# Patient Record
Sex: FEMALE | Race: WHITE | NOT HISPANIC OR LATINO | Employment: FULL TIME | URBAN - NONMETROPOLITAN AREA
[De-identification: names, ages, dates, MRNs, and addresses within clinical notes are randomized per-mention and may not be internally consistent; named-entity substitution may affect disease eponyms.]

---

## 2017-06-22 ENCOUNTER — HISTORY (OUTPATIENT)
Dept: FAMILY MEDICINE | Facility: OTHER | Age: 47
End: 2017-06-22

## 2017-06-22 ENCOUNTER — OFFICE VISIT - GICH (OUTPATIENT)
Dept: FAMILY MEDICINE | Facility: OTHER | Age: 47
End: 2017-06-22

## 2017-06-22 DIAGNOSIS — N62 HYPERTROPHY OF BREAST: ICD-10-CM

## 2017-06-22 DIAGNOSIS — B35.4 TINEA CORPORIS: ICD-10-CM

## 2017-06-22 DIAGNOSIS — Z00.00 ENCOUNTER FOR GENERAL ADULT MEDICAL EXAMINATION WITHOUT ABNORMAL FINDINGS: ICD-10-CM

## 2017-06-22 LAB
A/G RATIO - HISTORICAL: 1.5 (ref 1–2)
ABSOLUTE BASOPHILS - HISTORICAL: 0 THOU/CU MM
ABSOLUTE EOSINOPHILS - HISTORICAL: 0.2 THOU/CU MM
ABSOLUTE IMMATURE GRANULOCYTES(METAS,MYELOS,PROS) - HISTORICAL: 0 THOU/CU MM
ABSOLUTE LYMPHOCYTES - HISTORICAL: 2.7 THOU/CU MM (ref 0.9–2.9)
ABSOLUTE MONOCYTES - HISTORICAL: 0.6 THOU/CU MM
ABSOLUTE NEUTROPHILS - HISTORICAL: 5.3 THOU/CU MM (ref 1.7–7)
ALBUMIN SERPL-MCNC: 4.1 G/DL (ref 3.5–5.7)
ALP SERPL-CCNC: 58 IU/L (ref 34–104)
ALT (SGPT) - HISTORICAL: 28 IU/L (ref 7–52)
ANION GAP - HISTORICAL: 16 (ref 5–18)
AST SERPL-CCNC: 22 IU/L (ref 13–39)
BASOPHILS # BLD AUTO: 0.5 %
BILIRUB SERPL-MCNC: 0.4 MG/DL (ref 0.3–1)
BUN SERPL-MCNC: 12 MG/DL (ref 7–25)
BUN/CREAT RATIO - HISTORICAL: 18
CALCIUM SERPL-MCNC: 9.2 MG/DL (ref 8.6–10.3)
CHLORIDE SERPLBLD-SCNC: 100 MMOL/L (ref 98–107)
CHOL/HDL RATIO - HISTORICAL: 3.15
CHOLESTEROL TOTAL: 148 MG/DL
CO2 SERPL-SCNC: 22 MMOL/L (ref 21–31)
CREAT SERPL-MCNC: 0.66 MG/DL (ref 0.7–1.3)
EOSINOPHIL NFR BLD AUTO: 1.7 %
ERYTHROCYTE [DISTWIDTH] IN BLOOD BY AUTOMATED COUNT: 13.2 % (ref 11.5–15.5)
GFR IF NOT AFRICAN AMERICAN - HISTORICAL: >60 ML/MIN/1.73M2
GLOBULIN - HISTORICAL: 2.8 G/DL (ref 2–3.7)
GLUCOSE SERPL-MCNC: 99 MG/DL (ref 70–105)
HCT VFR BLD AUTO: 40 % (ref 33–51)
HDLC SERPL-MCNC: 47 MG/DL (ref 23–92)
HEMOGLOBIN: 13.6 G/DL (ref 12–16)
IMMATURE GRANULOCYTES(METAS,MYELOS,PROS) - HISTORICAL: 0.3 %
LDLC SERPL CALC-MCNC: 80 MG/DL
LYMPHOCYTES NFR BLD AUTO: 30.3 % (ref 20–44)
MCH RBC QN AUTO: 29.3 PG (ref 26–34)
MCHC RBC AUTO-ENTMCNC: 34 G/DL (ref 32–36)
MCV RBC AUTO: 86 FL (ref 80–100)
MONOCYTES NFR BLD AUTO: 6.9 %
NEUTROPHILS NFR BLD AUTO: 60.3 % (ref 42–72)
NON-HDL CHOLESTEROL - HISTORICAL: 101 MG/DL
PATIENT STATUS - HISTORICAL: NORMAL
PLATELET # BLD AUTO: 266 THOU/CU MM (ref 140–440)
PMV BLD: 9 FL (ref 6.5–11)
POTASSIUM SERPL-SCNC: 3.7 MMOL/L (ref 3.5–5.1)
PROT SERPL-MCNC: 6.9 G/DL (ref 6.4–8.9)
RED BLOOD COUNT - HISTORICAL: 4.64 MIL/CU MM (ref 4–5.2)
SODIUM SERPL-SCNC: 138 MMOL/L (ref 133–143)
TRIGL SERPL-MCNC: 103 MG/DL
TSH - HISTORICAL: 1.2 UIU/ML (ref 0.34–5.6)
WHITE BLOOD COUNT - HISTORICAL: 8.9 THOU/CU MM (ref 4.5–11)

## 2017-08-09 ENCOUNTER — HISTORY (OUTPATIENT)
Dept: RADIOLOGY | Facility: OTHER | Age: 47
End: 2017-08-09

## 2017-08-09 ENCOUNTER — HOSPITAL ENCOUNTER (OUTPATIENT)
Dept: RADIOLOGY | Facility: OTHER | Age: 47
End: 2017-08-09
Attending: FAMILY MEDICINE

## 2017-08-09 DIAGNOSIS — Z12.31 ENCOUNTER FOR SCREENING MAMMOGRAM FOR MALIGNANT NEOPLASM OF BREAST: ICD-10-CM

## 2017-12-27 NOTE — PROGRESS NOTES
Patient Information     Patient Name MRN Sex Hyacinth Dominguez [id number] Female 1970      Progress Notes by Cate Jaffe at 2017  1:34 PM     Author:  Cate Jaffe Service:  (none) Author Type:  (none)     Filed:  2017  1:34 PM Date of Service:  2017  1:34 PM Status:  Signed     :  Cate Jaffe            Falls Risk Criteria:    Age 65 and older or under age 4        Sensory deficits    Poor vision    Use of ambulatory aides    Impaired judgment    Unable to walk independently    Meets High Risk criteria for falls:  no

## 2017-12-28 NOTE — PATIENT INSTRUCTIONS
Patient Information     Patient Name MRN Hyacinth Mcdonald [id number] Female 1970      Patient Instructions by Christiane Rosales MD at 2017  8:34 AM     Author:  Christiane Rosales MD  Service:  (none) Author Type:  Physician     Filed:  2017  8:34 AM  Encounter Date:  2017 Status:  Addendum     :  Christiane Rosales MD (Physician)        Related Notes: Original Note by Christiane Rosales MD (Physician) filed at 2017  8:34 AM            [facility]: Eating Right and Getting Fit -- Even When You Don t Have Time     Eating Right and Getting Fit  Eating well-balanced meals and getting regular physical activity can help you reduce your risk of certain diseases and increase your energy level. But if you are too busy, trying to eat right and be active may seem like impossible goals. They re not!  Making small changes that fit your lifestyle can help you slowly improve your diet and fitness. Finding your balance between smart food choices and physical activity can help you feel better -- even when you don t have time.  Most of the information in this booklet is from the [place] Department of Agriculture. For complete information, visit choosemyplate.gov.  For information about your health, talk with your health care provider.  Nutrition  A healthful diet is one that:            focuses on fruit, vegetables, whole grains, and fat-free or low-fat milk            includes lean meats, poultry, fish, beans, eggs and nuts            is low in saturated fats, trans fats, cholesterol, salt and added sugars.  To help make good food choices, you should eat a variety of foods from all of the following food groups.  Grain Group  Grain products are made from wheat, rice, oats, cornmeal, barley or another cereal grain. Examples of foods in this group are bread, pasta, oatmeal, tortillas and grits. Grains are split into two groups:            whole grains  These contain the entire grain  kernel (bran, germ and endosperm). The whole grains are rich in fiber, B vitamins and iron. Examples are whole-wheat flour, bulgur, oatmeal and brown rice. Make at least half of your grains whole grains.            refined grains  These have gone through a process to remove the bran and germ. This gives the grains a fine texture but removes the fiber, iron and several B vitamins. Examples are white flour, white bread and white rice.    Most refined grains are enriched. This means some B vitamins and iron are added back in after processing.    Benefits of eating whole grains            Eating foods rich in fiber may:  -   reduce the risk of heart disease, obesity and type 2 diabetes  -   help lower cholesterol levels  -   reduce constipation  -   help you manage your weight (helps keep you feeling  full  longer)  -   help prevent neural tube defects during pregnancy.            Whole grains contain fiber, many B vitamins (such as thiamin, riboflavin, niacin and folate) and minerals (such as iron and magnesium).           The vitamins and minerals in whole grains help build red blood cells, build bones, and release energy.  Tips for eating whole grains            Try whole-wheat bread, whole-wheat pasta or brown rice instead of white bread, white pasta or white rice.            Use whole grains in mixed dishes. For instance, use barley in vegetable soups or stews and bulgur wheat in casseroles.            Use whole-grain bread or cracker crumbs in meatloaf.            Add whole-grain flour or oatmeal when making cookies.            Try a 100 percent whole-grain snack.    Amounts needed each day  The following recommendations are for adults who get less than 30 minutes of moderate physical activity a day.            Women:  -   19 to 50 years: 6 ounces  -   51 + years: 5 ounces            Men:  -   19 to 30 years: 8 ounces  -   31 to 50 years: 7 ounces  -   51 + years: 6 ounces    Serving sizes  One ounce of grains is  equal to:            1 slice of bread            1 cup of ready-to-eat cereal              cup cooked rice, pasta or cereal            five whole wheat crackers              English muffin            1 pancake 4   inches in diameter            3 cups popped popcorn            1 flour tortilla 6 inches in diameter.  Vegetable Group  Any vegetable or 100 percent vegetable juice is included in this group. Vegetables may be raw, cooked, fresh, frozen, canned or dried. They are split up into five groups:            dark green  bok kell, broccoli, trinity greens, dark green leafy lettuce, kale, iman lettuce, spinach, turnip greens            red and orange  acorn squash, butternut squash, carrots, pumpkin, red peppers, sweet potatoes, tomatoes            beans and peas  black beans, black-eyed peas, garbanzo beans, kidney beans, lentils, navy beans, raymundo beans, soy beans, split peas, white beans            starchy  cassava, corn, green peas, plantains, potatoes, taro            other vegetables  artichokes, asparagus, bean sprouts, beets, brussels sprouts, cabbage, cauliflower, celery, cucumbers, eggplant, green beans, green peppers, iceberg lettuce, mushrooms, okra, onions, zucchini.    Benefits of eating vegetables  Most vegetables are low in fat and calories. Vegetables do not have cholesterol. Vegetables are a good source of potassium, fiber and vitamins A and C.            Eating a diet rich in vegetables may:  -   reduce the risk for type 2 diabetes, stroke, heart disease, obesity  -   help protect against certain cancers  -   lower blood pressure  -   help you manage your weight (helps keep you feeling  full  longer).            Vitamin A in vegetables helps keep your eyes and skin healthy.            Vitamin C in vegetables helps keep your teeth and gums healthy. It helps your body absorb iron, and helps your body heal from cuts and wounds.    Tips for eating vegetables            Buy fresh vegetables when in  season.            Stock up on frozen vegetables.            Eat vegetables as snacks.            Vary your vegetables.            Prepare more foods from fresh ingredients. If you use canned vegetables, look for cans that are labeled  reduced sodium,   low sodium  or  no salt added.             Use vegetables as main dishes.            Shred carrots or zucchini into meatloaf, casseroles, quick breads or muffins.            Add chopped vegetables to pizza or in pasta sauce.            Eat raw vegetables with low-fat salad dressing or other low-fat dip.    Amounts needed each day  The following recommendations are for adults who get less than 30 minutes of moderate physical activity a day.            Women:  -   19 to 50 years: 2   cups  -   51 + years: 2 cups            Men:  -   19 to 50 years: 3 cups  -   51 + years: 2   cups    Serving sizes  One cup of vegetables is equal to:            1 cup of raw or cooked vegetables or vegetable juice            2 cups of raw leafy greens.  Fruit Group  Any fruit or 100 percent fruit juice is included in this group. Fruits may be fresh, frozen, canned or dried.  Benefits of eating fruits  Most fruits are low in fat, sodium and calories. They do not have cholesterol. Fruits are rich in potassium, fiber, vitamin C and folate (folic acid).            Eating a diet rich in fruits may:  -   reduce the risk for type 2 diabetes, stroke, heart disease, obesity  -   help protect against certain cancers  -   lower blood pressure  -   help you manage your weight (helps keep you feeling  full  longer).  -   help lower your cholesterol.    Tips for eating fruits            Keep a bowl of whole fruit on the table, counter or in the refrigerator.            Buy fresh fruits in season.            Buy fruits that are dried, frozen and canned (in water or 100 percent juice).            Cut up fruit (or buy pre-cut fruit) to have on hand for snacks.            Choose fruits that are high in  potassium, such as bananas, prunes and prune juice, peaches, apricots and orange juice.            Vary your fruit choices.            Add cut-up bananas or peaches to cereal.            Spread peanut butter on apple slices.            Keep a package of dried fruit handy for snacks.    Amounts needed each day  The following recommendations are for adults who get less than 30 minutes of moderate physical activity a day.            Women:  -   19 to 30 years: 2 cups  -   31 to 51 + years: 1   cups            Men:  -   19 to 51 + years: 2 cups    Serving sizes  One cup of fruit is equal to:              cup of dried fruit            1 large banana (8 to 9 inches long)            32 seedless grapes            about eight large strawberries            1 large peach or two halves, canned            1 medium pear            1 large orange            1 small apple.  Dairy Group  Foods in the milk group are those made from milk or fluid milk products. Foods in the milk group contain calcium, potassium, vitamin D and protein. Most dairy group choices should be fat-free or low-fat.  Foods made from milk that have little to no calcium (such as cream cheese, cream and butter) are not part of this group.  Common choices in this group are:            milk            milk-based desserts (puddings, ice milk, frozen yogurt, ice cream)            calcium-fortified soymilk            cheese            yogurt    Benefits of eating/drinking dairy products  Calcium in milk and milk products helps build and   maintain bones and teeth. Foods in the dairy group also have potassium, vitamin D and protein.            Eating a diet rich in low-fat or fat-free dairy may:  -   reduce your risk of osteoporosis (weak, brittle bones)  -   reduce the risk for type 2 diabetes, stroke, heart disease.  Most milk group choices should be fat-free or low-fat. Many cheese, whole milk and products made from them are high in cholesterol. Limit the amount of  these foods you eat.  Tips for making wise choices            Include milk or calcium-fortified soymilk at meals. Choose low-fat or fat-free milk.            If you usually drink whole milk, switch to reduced fat (2 percent), then low-fat (1 percent) and then fat-free (skim).            If you have coffee drinks with milk, ask for fat-free milk.            Use fat-free or low-fat milk when making condensed cream soups.            Have fat-free or low-fat yogurt as a snack.            Make fruit-yogurt smoothies in a .            Eat cut-up fruit with flavored yogurt for a dessert.            Top a baked potato with fat-free or low-fat yogurt.    Amounts needed each day  The following recommendations are for adults who get less than 30 minutes of moderate physical activity a day.            Women:  -   19 to 51 + years: 3 cups            Men:  -   19 to 51 + years: 3 cups    Serving sizes  One cup of dairy is equal to:            1 cup of milk            1 cup (8 ounces) yogurt            1   ounces of natural cheeses            2 ounces of processed cheese              cup ricotta cheese            2 cups cottage cheese            1 cup pudding made with milk            1   cups ice cream.  Protein Group  All foods made from meat, poultry, seafood, beans and peas, eggs, processed soy products, nuts and seeds are included in the protein group. (Beans and peas are also in the vegetable group.)  Select a variety of foods from this group. Examples of foods in this group include:            meats (choose lean or low-fat meats):  beef, ham, lamb, pork, veal            poultry (choose lean or low-fat poultry):  chicken, turkey, goose and duck            beans and peas:  black beans, black-eyed peas, chickpeas, falafel, kidney beans, lentils, navy beans, raymundo beans, split beans, processed soy products (tofu, bean or veggie burgers, tempeh)            nuts and seeds (choose unsalted nuts and seeds):  almonds,  cashews, hazelnuts, mixed nuts, peanuts, peanut butter, pecans, pistachios, pumpkin seeds, sunflower seeds, walnuts            seafood:  finfish (cod, brooke, halibut, mackerel, salmon, sea bass, swordfish, trout, tuna), shellfish (clams, crab, crayfish, lobster, oysters, scallops, shrimp), canned fish (anchovies, tuna, sardines)            eggs:  chicken and duck eggs    Benefits of eating protein products  Food in the protein group provides protein, B vitamins, vitamin E, iron, zinc and magnesium. These nutrients help keep bones, muscles, cartilage, skin and blood healthy. Iron is used to carry oxygen in the blood.            Eating a diet rich in low-fat or lean proteins may reduce your risk of heart disease.            Eat at least 8 ounces of seafood each week. Seafood is rich in omega-3 fatty acids, which helps protect your heart against heart disease.  -   Follow any precautions if you have a shellfish allergy.  -   If you are pregnant, visit the [facility] of Health website to read the Statewide Safe Eating Guidelines (health.state.[place]/fish).            Some meats and poultry are high in cholesterol and/or saturated fat. These foods can raise your blood cholesterol level. Limit the amount of these foods you eat: fatty cuts of beef, pork and lamb; regular ground beef; sausages, hot dogs and mcdonough; some luncheon meats (bologna and salami); duck; egg yolks; organ meats.    Tips for making wise choices            Choose lean cuts of meat, including:  -   beef: round steaks and roasts, top loin, top sirloin, roma shoulder, arm roasts, extra lean ground beef (90 to 95 percent lean)  -   pork: pork loin, tenderloin, center loin, ham  -   poultry: boneless, skinless chicken breasts and turkey cutlets.            Choose lean turkey, roast beef, ham or low-fat luncheon (deli) meats for sandwiches.            Trim fats from meat and poultry before cooking.            Broil, grill, roast, poach or boil  meat, poultry or fish.            Drain off any fat during cooking.            Prepare beans or peas without added fats.            Choose seafoods high in omega-3 fatty acids, such as salmon, trout and herring.            Choose beans, peas or soy products as a main dish or part of a meal often.            Choose unsalted nuts as a snack, on salads or in main dishes.  Amounts needed each day  The following recommendations are for adults who get less than 30 minutes of moderate physical activity a day.            Women:  -   19 to 30 years: 5   ounces  -   31 to 51 + years: 5 ounces            Men:  -   19 to 30 years: 6   ounces  -   31 to 50 years: 6 ounces  -   51 + years: 5   ounces    Serving sizes  One ounce of food from the protein group is equal to:            1 ounce of meat, poultry or fish              cup cooked beans            1 egg            1 tablespoon of peanut butter              ounce of nuts and seeds.  Oils and Liquid Fats  Oils are fats that are liquid at room temperature. They come from plants and from fish. Oils are not a food group but they are important for your overall health. Liquid, plant-based oils do not contain cholesterol.  Common oils are canola, corn, cottonseed, olive, safflower, soybean and sunflower.  Foods naturally high in oils include nuts, olives, some fish and avocados.  Foods that are mainly oil include mayonnaise, some salad dressings, and soft margarine with no trans fats. (Read food labels to find margarines that have 0 grams of trans fat.)  Most oils are high in monounsaturated or polyunsaturated fats and low in saturated fats. They also contain vitamin E. Oils from plant sources (vegetable and nut oils) do not have cholesterol.  Choose fats that have monounsaturated or polyunsaturated fats. These do not raise the LDL ( bad ) cholesterol in your blood.  Solid fats  Solid fats are solid at room temperature (like butter and shortening). They come from many animal foods  and are also made from vegetable oils through a process called hydrogenation.  Common fats include butter, cream, milk fat, tallow, chicken fat, lard, stick margarine, shortening and partially hydrogenated oil.  Most solid fats are high in saturated fats and trans fats or both. These can raise the LDL ( bad ) cholesterol levels in your blood. This increases your risk for heart disease.  Health and calorie count  Monounsaturated and polyunsaturated fats are liquid at room temperature (such as oils). Saturated fats are solid at room temperature (such as butter or stick margarine).  Liquid oils are, in general, better for your health than solid fats because they have less saturated and trans fats. Both oils and solids fat contain about 120 calories in one tablespoon.  Added Sugars  Sugars are found naturally in fruits and milk. Added sugars are sugars and syrups that are added to foods. Major sources of foods and drinks that have added sugars are:            regular soft drinks, energy drinks, sports drinks            candy            cakes            cookies            pies and cobblers            sweet rolls, pastries, doughnuts            fruit drinks            dairy desserts (such as ice cream).    To tell if a food has added sugar, look at the food label for words that include  sugar  or  -ose  at the end of a word. These words include:            brown sugar, powdered sugar, invert sugar, white granulated sugar, raw sugar            dextrose, fructose, lactose, sucrose            corn syrup            honey            maple syrup            molasses, nectars.    Empty Calories  Empty calories are calories you eat or drink from solid fats or added sugars. These foods have calories but very few nutrients.  Solid fats and added sugars can make a food or drink more tasty but they can add a lot of calories. The foods and drinks that have the most empty calories are:            cakes, cookies, pastries, doughnuts             sodas, energy drinks, sports drinks, fruit drinks            cream cheese, butter, salad dressings            ice cream            sausages, hot dogs, mcdonough.  It is important to limit empty calories. This is based on how many calories you need each day, your gender, age and how much exercise you get.  Create New Healthful Eating Habits  It can be difficult to limit unhealthful foods, especially if you have a short lunch break or can t have a sit-down meal. Keep a record of what you eat each day for one week may give you an idea of the foods you re lacking or the foods you re eating too much.  Instead of grabbing food from vending machines or stopping at fast food restaurants, have pre-cut vegetables, fruits, lean meat sandwiches, and other foods ready to go. Pack a lunch for work the night before. Have nutritious snacks and 100 percent fruit juices available.  Don t try to make all of your changes overnight. Start with one or two areas and gradually keep making more changes. You will be more successful if you make small strides and stick with them than trying to make major changes for just a few days. You don t have to give up all of the unhealthy food, but instead of going out to a fast food restaurant three times a week, go once every two weeks. Variety will help you stay interested in continuing your plan to healthy living.  Portion Sizes  A key part of a healthy lifestyle is eating the right portion sizes. To help keep servings sizes in proportion, use smaller plates. Use the following chart for correct portion sizes.    Physical Activity  Physical activity is moving your body. For health benefits, you should be moderately or vigorously active for at least 30 minutes a day, most days of the week. These activities should increase your heart rate.  Moderate activities include:            walking briskly (about 3   miles per hour)            hiking            gardening/yard work            dancing             playing golf (walking and carrying your clubs)            bicycling (at least 10 miles per hour)            weight training (general light workout).    Vigorous physical activities include:            running/jogging (5 miles per hour)            bicycling (more than 10 miles per hour)            swimming (freestyle laps)            aerobics            heavy yard work (such as chopping wood)            weight lifting (vigorous effort)            competitive sports.    Physical activity is important to living a longer, healthier and happier life. It can:            relieve stress            give you an overall feeling of well-being            improve your self-esteem            help build and maintain bones, muscles and joints            build endurance and muscle strength            helps lower your risk of heart disease, colon cancer and type 2 diabetes            helps control blood pressure            reduces feelings of depression and anxiety.    Finding Physical Activity You Enjoy  There are three basic kinds of physical activity: aerobic activities, resistance/strength training, and balance and stretching.            Aerobic activities speed your heart rate and breathing. It helps improve your heart and lung fitness. Examples include brisk walking, jogging and swimming.            Resistance, strength training and weight-bearing activities help build and maintain bones and muscles. Examples include lifting weights and walking.            Balance and stretching activities enhance your stability and flexibility. Examples include gently stretching, dancing, yoga and the martial arts.  If you do not have a regular physical activity routine, you can start one at any time. You do not need to join health clubs or buy expensive equipment --  simply find activities you enjoy and someone to help keep you motivated!  Make physical activity a regular part of your day. You can break up your 30 minutes of physical activity  into chunks of 10 minutes. You can take a 10-minute walk in the morning, walk up and down stairs at work for 10 minutes, and do stretching for 10 minutes before bedtime.  Before you start or increase a physical activity program, or if you have a health concern, please talk with your health care provider.  How To Increase Your Physical Activity  You can fit physical activity into your everyday routine.            Use stairs instead of the escalator or elevator.            Drive less and walk or bike more often.            Park at the far end of the parking lot.            Get off the bus one stop early and walk the rest of the way.            Use a push mower to cut your grass.            [place] leaves instead of blowing them into a pile.            Push a stroller or walk the dog.            Ride a stationary bike while watching TV.            Clean the house.            Join a walking group.            Walk regularly at a local mall.            Go for bike rides with your kids.            Wash your car.            Build a snowman with your kids or dance.            Take a 10-minute break at work to go for a brisk walk.            Do water aerobics.            Build weekend family activities around physical activities.  Whatever you choose to do, make sure you re having fun while being active!  Information adapted from chooseInteractive Fateplate.gov.  The website contains tips and resources, foods to eat more and less of, and nutrition information for women who are pregnant or breastfeeding, children, and people who want to lose weight.   2012 [employer]  OTHER TRADEMARKS USED ARE OWNED BY THEIR RESPECTIVE OWNERS  THIS BOOKLET DOES NOT REPLACE MEDICAL OR PROFESSIONAL ADVICE; IT IS ONLY A GUIDE  xygy-zh-88424 (4/12)

## 2017-12-28 NOTE — PROGRESS NOTES
Patient Information     Patient Name MRN Hyacinth Mcdonald [id number] Female 1970      Progress Notes by Christiane Rosales MD at 2017  8:03 AM     Author:  Christiane Rosales MD Service:  (none) Author Type:  Physician     Filed:  2017  8:34 AM Encounter Date:  2017 Status:  Signed     :  Christiane Rosales MD (Physician)              SUBJECTIVE:    Hyacinth Powell is a 47 y.o. female who presents for annual exam and is not due for PAP.   Lots of stress with parents illness for she and her spouse has now subsided.     Some evidence of perimenopausal changes with more irregular menses.      PROBLEM LIST:  Patient Active Problem List     Diagnosis  Code     OBESITY E66.9     ADENOCARCINOMA, BREAST, FAMILY HX Z80.3     POSTPARTUM DEPRESSION, HX OF Z86.59     BREAST ASYMMETRY R>L N62     Allergic rhinitis J30.9     History of major depression Z86.59     PAST MEDICAL HISTORY:  Past Medical History:     Diagnosis  Date     Hx of pregnancy     , 1 Spont miscarriage, 2 term vaginal       Post partum depression      SURGICAL HISTORY:  Past Surgical History:      Procedure  Laterality Date     ANAL SURGERY      Anal fistula-Dr White          SOCIAL HISTORY:  Social History     Social History        Marital status:       Spouse name: N/A     Number of children:  N/A     Years of education:  N/A     Occupational History      Not on file.     Social History Main Topics          Smoking status:   Never Smoker      Smokeless tobacco:   Never Used      Alcohol use   0.0 oz/week     0 Standard drinks or equivalent per week        Comment: Rarely       Drug use:   No      Sexual activity:   Yes      Partners:  Male      Birth control/ protection:  Sterilization       Comment: Spouse-vas       Other Topics   Concern     Caffeine Concern  No     Exercise  No     Poor at exercise      Seat Belt  Yes     Social History Narrative     Moved to  Summer 2011 to help care for aging  "parents.     ~Children:  James 2004                 Buddy  2010    Spouse works as para for the [employer]    Works at [employer]     Mother lives with them                 FAMILY HISTORY:  Family History       Problem   Relation Age of Onset     Cancer-breast  Mother      Diabetes  Mother      Type 2       Heart Disease  Father      CAD/s/p CABG       Cerebral aneurysm  Sister      Good Health  Son      2004       Good Health  Son      2010        CURRENT MEDICATIONS:   No current outpatient prescriptions on file.     No current facility-administered medications for this visit.      Medications have been reviewed by me and are current to the best of my knowledge and ability.    ALLERGIES:  Aleve [naproxen] and Penicillins     REVIEW OF SYSTEMS:  General: denies any general problems.  Eyes: denies problems  Ears/Nose/Throat: denies problems  Cardiovascular: denies problems  Respiratory: denies problems  Gastrointestinal: denies problems  Genitourinary: denies problems  Musculoskeletal: denies problems  Skin: Patches of rash on left shoulder, under right breast, slightly itchy. Has used triamcinolone and over the counter antifungal for short periods of time, \"seems to come back\".  Neurologic: denies problems  Psychiatric: denies problems  Endocrine: denies problems  Heme/Lymphatic: denies problems  Allergic/Immunologic: denies problems  PHQ Depression Screening 6/22/2017   Date of PHQ exam (doc flow) 6/22/2017   1. Lack of interest/pleasure 0 - Not at all   2. Feeling down/depressed 0 - Not at all   PHQ-2 TOTAL SCORE 0      OBJECTIVE:  /80  Pulse 72  Ht 1.594 m (5' 2.75\")  Wt 102.2 kg (225 lb 6.4 oz)  LMP 06/04/2017  BMI 40.25 kg/m2   Wt Readings from Last 4 Encounters:    06/22/17 102.2 kg (225 lb 6.4 oz)   11/08/15 103.6 kg (228 lb 6 oz)    101.2 kg (223 lb)   02/16/15 104.1 kg (229 lb 6.4 oz)        EXAM:   General Appearance: Pleasant, alert, appropriate appearance for age. No " acute distress  Eyes:wears glasses.  Ear Exam: Normal TM's bilaterally. Normal auditory canals and external ears. Non-tender.  Nose Exam: Normal external nose, mucus membranes, and septum.  OroPharynx Exam:  Dental hygiene adequate. Normal buccal mucose. Normal pharynx.  Neck Exam:  Supple, no masses or nodes.  Thyroid Exam: No nodules or enlargement.  Chest/Respiratory Exam: Normal chest wall and respirations. Clear to auscultation.  Breast Exam: No dimpling, nipple retraction or discharge. No masses or nodes.  Cardiovascular Exam: Regular rate and rhythm. S1, S2, no murmur, click, gallop, or rubs.  Gastrointestinal Exam: Soft, non-tender, no masses or organomegaly.  Lymphatic Exam: Non-palpable nodes in neck, clavicular, axillary, or inguinal regions.  Musculoskeletal Exam: Back is straight and non-tender, full ROM of upper and lower extremities.  Foot Exam: Left and right foot: good pedal pulses, no lesions, nail hygiene good.  Skin: faint pink rash on left anterior shoulder and below right breast consistent with tinea  Neurologic Exam: Nonfocal, symmetric DTRs, normal gross motor, tone coordination and no tremor.  Psychiatric Exam: Alert and oriented - appropriate affect.    ASSESSMENT/PLAN    ICD-10-CM    1. Health maintenance examination Z00.00 TSH      COMPLETE METABOLIC PANEL      LIPID PANEL      CBC WITH DIFFERENTIAL      TSH      COMPLETE METABOLIC PANEL      LIPID PANEL      CBC WITH DIFFERENTIAL      CBC WITH AUTO DIFFERENTIAL   2. Tinea corporis B35.4    3. BREAST ASYMMETRY R>L N62      Ms. Ramirez Body mass index is 40.25 kg/(m^2). This is out of the normal range for a 47 y.o. Normal range for ages 18+ is between 18.5 and 24.9. To lose weight we reviewed risks and benefits of appropriate options such as diet, exercise, and medications. Patient's strategy will be  self-directed nutrition plan and self-directed exercise program  BP Readings from Last 1 Encounters:06/22/17 : 128/80  Ms. Ramirez blood  pressure is out of the normal range for adults. Per JNC-8 guidelines normal adult blood pressure is < 120/80, pre-hypertensive is between 120/80 and 139/89, and hypertension is 140/90 or greater. Risks of hypertension were discussed. Patient's strategy will be to recheck blood pressure in 12 months.  Plan:  Seen today and so labs are repeated. She will get these via my chart.  Pap is up-to-date until next year.  Tetanus update next year.  She'll schedule mammogram when due.  Weight loss and exercise discussed. She tends to do better in the summer months with exercise and activity level. They just bought a new puppy so she will be area busy with training and walking a dog.  Follow up annually.  Christiane Rosales MD  8:33 AM 6/22/2017

## 2018-01-27 VITALS
WEIGHT: 225.4 LBS | HEART RATE: 72 BPM | SYSTOLIC BLOOD PRESSURE: 128 MMHG | BODY MASS INDEX: 39.94 KG/M2 | HEIGHT: 63 IN | DIASTOLIC BLOOD PRESSURE: 80 MMHG

## 2018-02-02 ENCOUNTER — DOCUMENTATION ONLY (OUTPATIENT)
Dept: FAMILY MEDICINE | Facility: OTHER | Age: 48
End: 2018-02-02

## 2018-02-16 ENCOUNTER — OFFICE VISIT (OUTPATIENT)
Dept: FAMILY MEDICINE | Facility: OTHER | Age: 48
End: 2018-02-16
Attending: NURSE PRACTITIONER
Payer: COMMERCIAL

## 2018-02-16 VITALS
BODY MASS INDEX: 40.82 KG/M2 | WEIGHT: 230.38 LBS | DIASTOLIC BLOOD PRESSURE: 78 MMHG | TEMPERATURE: 101.4 F | HEIGHT: 63 IN | HEART RATE: 80 BPM | SYSTOLIC BLOOD PRESSURE: 136 MMHG

## 2018-02-16 DIAGNOSIS — J10.1 INFLUENZA A: Primary | ICD-10-CM

## 2018-02-16 DIAGNOSIS — N39.0 SYMPTOMATIC URINARY TRACT INFECTION: ICD-10-CM

## 2018-02-16 DIAGNOSIS — R39.89 URINARY PROBLEM: ICD-10-CM

## 2018-02-16 DIAGNOSIS — J11.1 INFLUENZA-LIKE ILLNESS: ICD-10-CM

## 2018-02-16 DIAGNOSIS — R50.9 FEVER IN ADULT: ICD-10-CM

## 2018-02-16 LAB
ALBUMIN UR-MCNC: NEGATIVE MG/DL
APPEARANCE UR: CLEAR
BACTERIA #/AREA URNS HPF: ABNORMAL /HPF
BILIRUB UR QL STRIP: NEGATIVE
COLOR UR AUTO: YELLOW
FLUAV+FLUBV RNA SPEC QL NAA+PROBE: NEGATIVE
FLUAV+FLUBV RNA SPEC QL NAA+PROBE: POSITIVE
GLUCOSE UR STRIP-MCNC: NEGATIVE MG/DL
HGB UR QL STRIP: ABNORMAL
KETONES UR STRIP-MCNC: NEGATIVE MG/DL
LEUKOCYTE ESTERASE UR QL STRIP: NEGATIVE
NITRATE UR QL: NEGATIVE
NON-SQ EPI CELLS #/AREA URNS LPF: ABNORMAL /LPF
PH UR STRIP: 5.5 PH (ref 5–7)
RBC #/AREA URNS AUTO: ABNORMAL /HPF
RSV RNA SPEC NAA+PROBE: NEGATIVE
SOURCE: ABNORMAL
SP GR UR STRIP: 1.02 (ref 1–1.03)
SPECIMEN SOURCE: ABNORMAL
UROBILINOGEN UR STRIP-ACNC: 0.2 EU/DL (ref 0.2–1)
WBC #/AREA URNS AUTO: ABNORMAL /HPF

## 2018-02-16 PROCEDURE — 99214 OFFICE O/P EST MOD 30 MIN: CPT | Performed by: NURSE PRACTITIONER

## 2018-02-16 PROCEDURE — 87086 URINE CULTURE/COLONY COUNT: CPT | Performed by: NURSE PRACTITIONER

## 2018-02-16 PROCEDURE — 81001 URINALYSIS AUTO W/SCOPE: CPT | Performed by: NURSE PRACTITIONER

## 2018-02-16 PROCEDURE — 87088 URINE BACTERIA CULTURE: CPT | Performed by: NURSE PRACTITIONER

## 2018-02-16 PROCEDURE — 87631 RESP VIRUS 3-5 TARGETS: CPT | Performed by: NURSE PRACTITIONER

## 2018-02-16 RX ORDER — NITROFURANTOIN 25; 75 MG/1; MG/1
100 CAPSULE ORAL 2 TIMES DAILY
Qty: 10 CAPSULE | Refills: 0 | Status: SHIPPED | OUTPATIENT
Start: 2018-02-16 | End: 2018-02-21

## 2018-02-16 RX ORDER — INFLUENZA A VIRUS A/NEBRASKA/14/2019 (H1N1) ANTIGEN (MDCK CELL DERIVED, PROPIOLACTONE INACTIVATED), INFLUENZA A VIRUS A/DELAWARE/39/2019 (H3N2) ANTIGEN (MDCK CELL DERIVED, PROPIOLACTONE INACTIVATED), INFLUENZA B VIRUS B/SINGAPORE/INFTT-16-0610/2016 ANTIGEN (MDCK CELL DERIVED, PROPIOLACTONE INACTIVATED), INFLUENZA B VIRUS B/DARWIN/7/2019 ANTIGEN (MDCK CELL DERIVED, PROPIOLACTONE INACTIVATED) 15; 15; 15; 15 UG/.5ML; UG/.5ML; UG/.5ML; UG/.5ML
INJECTION, SUSPENSION INTRAMUSCULAR
Refills: 0 | COMMUNITY
Start: 2017-11-03 | End: 2018-10-18

## 2018-02-16 RX ORDER — OSELTAMIVIR PHOSPHATE 75 MG/1
75 CAPSULE ORAL 2 TIMES DAILY
Qty: 10 CAPSULE | Refills: 0 | Status: SHIPPED | OUTPATIENT
Start: 2018-02-16 | End: 2018-10-18

## 2018-02-16 NOTE — PROGRESS NOTES
HPI:    Hyacinth Powell is a 48 year old female  who presents to clinic today for UTI and URI symptoms.      Dysuria and pressure/fullness for a couple of days.  Symptoms lessened yesterday but worse today.  Denies urinary frequency or urgency.  No noted blood in urine.  No vaginal itching or discharge. No nausea or vomiting.  Decreased appetite.  Drinking fluids well.  No diarrhea or constipation.  Mid and upper back pain today.   Drinking cranberry juice with mild relief yesterday.         Intermittent productive, cough, chest congestion, runny nose, mild sore throat  Started 48 hours ago.  Fever started today.  Chills today.  No sinus pressure.  No chest tightness or heaviness.  Some rattling in chest that clears with coughing.  Mild shortness of breath initially, resolved.  Headache about 4-5 days ago.  No body aches other than back pain.    Taking Ibuprofen.  Taking Dayquil today.  Had flu shot.            Past Medical History:   Diagnosis Date     Personal history of other medical treatment (CODE)     , 1 Spont miscarriage, 2 term vaginal     Puerperal psychosis     No Comments Provided     Past Surgical History:   Procedure Laterality Date     OTHER SURGICAL HISTORY      ,,ANAL SURGERY,Anal fistula-Dr White     Social History   Substance Use Topics     Smoking status: Never Smoker     Smokeless tobacco: Never Used     Alcohol use 0.0 oz/week      Comment: Alcoholic Drinks/day: Rarely     Current Outpatient Prescriptions   Medication Sig Dispense Refill     FLUCELVAX QUADRIVALENT 0.5 ML AIDEE ADM 0.5ML IM UTD  0     Allergies   Allergen Reactions     Naproxen      Other reaction(s): GI Upset     Penicillins Diarrhea         Past medical history, past surgical history, current medications and allergies reviewed and accurate to the best of my knowledge.        ROS:  Refer to HPI    /78 (BP Location: Right arm, Patient Position: Sitting, Cuff Size: Adult Large)  Pulse 80  Temp 101.4  F (38.6  " C) (Tympanic)  Ht 5' 3\" (1.6 m)  Wt 230 lb 6 oz (104.5 kg)  BMI 40.81 kg/m2    EXAM:  General Appearance: Well appearing adult female, non toxic appearance, appropriate appearance for age. No acute distress  Head: normocephalic, atraumatic  Ears: Left TM grey, translucent with bony landmarks appreciated, no erythema, no effusion, no bulging, no purulence.  Right TM grey, translucent with bony landmarks appreciated, no erythema, no effusion, no bulging, no purulence.  Left auditory canal clear.  Right auditory canal clear.  Normal external ears, non tender.  Eyes: bilateral bulbar conjunctivae with mild erythema and glossy appearance, no drainage or crusting, no eyelid swelling, pupils equal   Orophayrnx: moist mucous membranes, posterior pharynx with erythema, tonsils without hypertrophy, no erythema, no exudates or petechiae, no post nasal drip seen, no trismus.    Sinuses:  No sinus tenderness upon palpation of the frontal or maxillary sinuses  Neck: supple without adenopathy  Respiratory: normal chest wall and respirations.  Normal effort.  Clear to auscultation bilaterally, no wheezing, crackles or rhonchi.  No increased work of breathing.  No cough appreciated.  Cardiac: RRR with no murmurs  Abdomen: soft, nontender, no masses or organomegally, no rebound tenderness or guarding, normal bowel sounds present  :  No suprapubic tenderness to palpation.  No CVA tenderness to palpation.    Musculoskeletal:  Normal gait.  Equal movement of bilateral upper extremities.  Equal movement of bilateral lower extremities.    Dermatological: no rashes noted of exposed skin  Psychological: normal affect, alert and pleasant      Labs:  Results for orders placed or performed in visit on 02/16/18   UA reflex to Microscopic   Result Value Ref Range    Color Urine Yellow     Appearance Urine Clear     Glucose Urine Negative NEG^Negative mg/dL    Bilirubin Urine Negative NEG^Negative    Ketones Urine Negative NEG^Negative " mg/dL    Specific Gravity Urine 1.025 1.003 - 1.035    Blood Urine Small (A) NEG^Negative    pH Urine 5.5 5.0 - 7.0 pH    Protein Albumin Urine Negative NEG^Negative mg/dL    Urobilinogen Urine 0.2 0.2 - 1.0 EU/dL    Nitrite Urine Negative NEG^Negative    Leukocyte Esterase Urine Negative NEG^Negative    Source Midstream Urine    Urine Microscopic   Result Value Ref Range    WBC Urine 2-5 (A) OTO2^O - 2 /HPF    RBC Urine 2-5 (A) OTO2^O - 2 /HPF    Squamous Epithelial /LPF Urine Moderate (A) FEW^Few /LPF    Bacteria Urine Many (A) NEG^Negative /HPF   Influenza A and B and RSV PCR   Result Value Ref Range    Specimen Description Nasopharyngeal     Influenza A PCR Positive (A) NEG^Negative    Influenza B PCR Negative NEG^Negative    Resp Syncytial Virus Negative NEG^Negative           ASSESSMENT/PLAN:    ICD-10-CM    1. Influenza A J10.1 oseltamivir (TAMIFLU) 75 MG capsule   2. Urinary problem R39.89 UA reflex to Microscopic     Urine Microscopic   3. Fever in adult R50.9 Influenza A and B and RSV PCR   4. Influenza-like illness R69 Influenza A and B and RSV PCR   5. Symptomatic urinary tract infection N39.0 Urine Culture Aerobic Bacterial     nitroFURantoin, macrocrystal-monohydrate, (MACROBID) 100 MG capsule       Influenza A test - positive  Influenza B test - negative  RSV test - negative    Tamiflu 75 mg BID x 5 days    Encouraged fluids  Symptomatic treatment - salt water gargles, honey, elevation, humidifier, sinus rinse/netti pot, lozenges, etc   Tylenol or ibuprofen PRN      Urinalysis - many bacteria  Urine culture pending    Macrobid 100 mg BID x 5 days   Encouraged fluids  May use OTC Pyridium PRN  Will call if urine culture indicates need to change antibiotic      Follow up if symptoms persist or worsen or concerns    Lo Curry NP on 2/16/2018 at 3:26 PM

## 2018-02-16 NOTE — NURSING NOTE
Patient presents to the clinic for burning with urination that started a couple days ago, chest congestion and sinus drainage x 3 days. She has taken dayquil with relief. She reports no fevers besides today.  Nancy MUÑIZ CMA.......2/16/2018..1:25 PM

## 2018-02-16 NOTE — MR AVS SNAPSHOT
"              After Visit Summary   2/16/2018    Hyacinth Powell    MRN: [id number]           Patient Information     Date Of Birth          1970        Visit Information        Provider Department      2/16/2018 12:30 PM Lo Curry NP [facility]        Today's Diagnoses     Influenza A    -  1    Urinary problem        Fever in adult        Influenza-like illness        Symptomatic urinary tract infection           Follow-ups after your visit        Follow-up notes from your care team     Return if symptoms worsen or fail to improve.      Who to contact     If you have questions or need follow up information about today's clinic visit or your schedule please contact [facility] directly at [phone].  Normal or non-critical lab and imaging results will be communicated to you by Scalityhart, letter or phone within 4 business days after the clinic has received the results. If you do not hear from us within 7 days, please contact the clinic through Scalityhart or phone. If you have a critical or abnormal lab result, we will notify you by phone as soon as possible.  Submit refill requests through Sobresalen or call your pharmacy and they will forward the refill request to us. Please allow 3 business days for your refill to be completed.          Additional Information About Your Visit        MyChart Information     Sobresalen lets you send messages to your doctor, view your test results, renew your prescriptions, schedule appointments and more. To sign up, go to [email or web address] . Click on \"Log in\" on the left side of the screen, which will take you to the Welcome page. Then click on \"Sign up Now\" on the right side of the page.     You will be asked to enter the access code listed below, as well as some personal information. Please follow the directions to create your username and password.     Your access code is: [email or web address]  Expires: 5/17/2018  3:26 PM     Your " "access code will  in 90 days. If you need help or a new code, please call your [facility] clinic or [phone].        Care EveryWhere ID     This is your Care EveryWhere ID. This could be used by other organizations to access your [facility] medical records  [id number]        Your Vitals Were     Pulse Temperature Height BMI (Body Mass Index)          80 101.4  F (38.6  C) (Tympanic) 5' 3\" (1.6 m) 40.81 kg/m2         Blood Pressure from Last 3 Encounters:   18 136/78   17 128/80   11/08/15 124/78    Weight from Last 3 Encounters:   18 230 lb 6 oz (104.5 kg)   17 225 lb 6.4 oz (102.2 kg)   11/08/15 228 lb 6 oz (103.6 kg)              We Performed the Following     Influenza A and B and RSV PCR     UA reflex to Microscopic     Urine Culture Aerobic Bacterial     Urine Microscopic          Today's Medication Changes          These changes are accurate as of 18  3:26 PM.  If you have any questions, ask your nurse or doctor.               Start taking these medicines.        Dose/Directions    nitroFURantoin (macrocrystal-monohydrate) 100 MG capsule   Commonly known as:  MACROBID   Used for:  Symptomatic urinary tract infection   Started by:  Lo Curry NP        Dose:  100 mg   Take 1 capsule (100 mg) by mouth 2 times daily for 5 days   Quantity:  10 capsule   Refills:  0       oseltamivir 75 MG capsule   Commonly known as:  TAMIFLU   Used for:  Influenza A   Started by:  Lo Curry NP        Dose:  75 mg   Take 1 capsule (75 mg) by mouth 2 times daily   Quantity:  10 capsule   Refills:  0            Where to get your medicines      These medications were sent to [id number] - [place], [place] - [address] [facility] [address]     Phone:  [phone]     nitroFURantoin (macrocrystal-monohydrate) 100 MG capsule    oseltamivir 75 MG capsule                Primary Care Provider Office Phone # Fax #    Christiane " Raquel Rosales -[phone]       [address] [facility] [address]        Equal Access to Services     GLADIS FERNÁNDEZ : Hadii aad ku hadsandienichelle Young, martiluis eduardo penalozazulyha, fredajose concepcionjuanluis eduardo huangrebecca, waxenrique krystalin hayaagerard huangman romero laCorinnacandelaria pena. So [facility] [phone].    ATENCIÓN: Si habla español, tiene a guillaume disposición servicios gratuitos de asistencia lingüística. Llame al [phone].    We comply with applicable federal civil rights laws and [place] laws. We do not discriminate on the basis of race, color, national origin, age, disability, sex, sexual orientation, or gender identity.            Thank you!     Thank you for choosing [facility] AND [facility]  for your care. Our goal is always to provide you with excellent care. Hearing back from our patients is one way we can continue to improve our services. Please take a few minutes to complete the written survey that you may receive in the mail after your visit with us. Thank you!             Your Updated Medication List - Protect others around you: Learn how to safely use, store and throw away your medicines at www.disposemymeds.org.          This list is accurate as of 2/16/18  3:26 PM.  Always use your most recent med list.                   Brand Name Dispense Instructions for use Diagnosis    FLUCELVAX QUADRIVALENT 0.5 ML Tere   Generic drug:  Influenza Vac Subunit Quad      ADM 0.5ML IM UTD        nitroFURantoin (macrocrystal-monohydrate) 100 MG capsule    MACROBID    10 capsule    Take 1 capsule (100 mg) by mouth 2 times daily for 5 days    Symptomatic urinary tract infection       oseltamivir 75 MG capsule    TAMIFLU    10 capsule    Take 1 capsule (75 mg) by mouth 2 times daily    Influenza A

## 2018-02-17 ENCOUNTER — TELEPHONE (OUTPATIENT)
Dept: FAMILY MEDICINE | Facility: OTHER | Age: 48
End: 2018-02-17

## 2018-02-17 NOTE — TELEPHONE ENCOUNTER
Patient called stating she was diagnosed with Influenza A and UTI yesterday and says she is still running a fever. Patient would like to know at what point should she worry about the fever. Please advise.    Vicki Gao on 2/17/2018 at 4:16 PM

## 2018-02-17 NOTE — TELEPHONE ENCOUNTER
Called and spoke with patient after proper verification. Informed patient of below message. Patient stated understanding and no further questions at this time.   .Keily Winkler LPN............. February 17, 2018 5:14 PM

## 2018-02-17 NOTE — TELEPHONE ENCOUNTER
With influenza confirmed, I would not be concerned about a fever at this point.  This can commonly last for 5-6 days with influenza. It is important to monitor for improvement of her urinary symptoms, however. If she is still having urinary symptoms after tomorrow, she should call back.  Symone Ma PA-C on 2/17/2018 at 4:38 PM

## 2018-02-18 LAB
BACTERIA SPEC CULT: ABNORMAL
SPECIMEN SOURCE: ABNORMAL

## 2018-02-19 ENCOUNTER — HEALTH MAINTENANCE LETTER (OUTPATIENT)
Age: 48
End: 2018-02-19

## 2018-02-21 ENCOUNTER — TELEPHONE (OUTPATIENT)
Dept: FAMILY MEDICINE | Facility: OTHER | Age: 48
End: 2018-02-21

## 2018-02-21 NOTE — TELEPHONE ENCOUNTER
Patient still running low grade fever with influenza A- was notified that can last up to 7 days or so.  Melissa Bee LPN ...... 2/21/2018 11:34 AM

## 2018-09-14 ENCOUNTER — HOSPITAL ENCOUNTER (OUTPATIENT)
Dept: MAMMOGRAPHY | Facility: OTHER | Age: 48
Discharge: HOME | End: 2018-09-14
Attending: FAMILY MEDICINE | Admitting: FAMILY MEDICINE
Payer: COMMERCIAL

## 2018-09-14 DIAGNOSIS — Z12.31 VISIT FOR SCREENING MAMMOGRAM: ICD-10-CM

## 2018-09-14 PROCEDURE — 77067 SCR MAMMO BI INCL CAD: CPT

## 2018-10-10 ENCOUNTER — HEALTH MAINTENANCE LETTER (OUTPATIENT)
Age: 48
End: 2018-10-10

## 2018-10-18 ENCOUNTER — OFFICE VISIT (OUTPATIENT)
Dept: FAMILY MEDICINE | Facility: OTHER | Age: 48
End: 2018-10-18
Attending: FAMILY MEDICINE
Payer: COMMERCIAL

## 2018-10-18 VITALS
HEART RATE: 72 BPM | TEMPERATURE: 97.3 F | HEIGHT: 63 IN | SYSTOLIC BLOOD PRESSURE: 122 MMHG | DIASTOLIC BLOOD PRESSURE: 76 MMHG | BODY MASS INDEX: 41.92 KG/M2 | WEIGHT: 236.6 LBS

## 2018-10-18 DIAGNOSIS — E66.813 CLASS 3 SEVERE OBESITY DUE TO EXCESS CALORIES WITHOUT SERIOUS COMORBIDITY WITH BODY MASS INDEX (BMI) OF 40.0 TO 44.9 IN ADULT (H): ICD-10-CM

## 2018-10-18 DIAGNOSIS — Z12.4 PAP SMEAR FOR CERVICAL CANCER SCREENING: Primary | ICD-10-CM

## 2018-10-18 DIAGNOSIS — Z23 ENCOUNTER FOR IMMUNIZATION: ICD-10-CM

## 2018-10-18 DIAGNOSIS — E66.01 CLASS 3 SEVERE OBESITY DUE TO EXCESS CALORIES WITHOUT SERIOUS COMORBIDITY WITH BODY MASS INDEX (BMI) OF 40.0 TO 44.9 IN ADULT (H): ICD-10-CM

## 2018-10-18 PROCEDURE — 90471 IMMUNIZATION ADMIN: CPT | Performed by: FAMILY MEDICINE

## 2018-10-18 PROCEDURE — G0123 SCREEN CERV/VAG THIN LAYER: HCPCS

## 2018-10-18 PROCEDURE — 90686 IIV4 VACC NO PRSV 0.5 ML IM: CPT | Performed by: FAMILY MEDICINE

## 2018-10-18 PROCEDURE — 88142 CYTOPATH C/V THIN LAYER: CPT | Mod: TC

## 2018-10-18 PROCEDURE — 87624 HPV HI-RISK TYP POOLED RSLT: CPT

## 2018-10-18 PROCEDURE — 99213 OFFICE O/P EST LOW 20 MIN: CPT | Mod: 25 | Performed by: FAMILY MEDICINE

## 2018-10-18 NOTE — NURSING NOTE
Patient presents to clinic for Gynecological exam.  Marianna Treviño ....................  10/18/2018   7:45 AM

## 2018-10-18 NOTE — PATIENT INSTRUCTIONS
Prevention Guidelines, Women Ages 40 to 49  Screening tests and vaccines are an important part of managing your health. A screening test is done to find possible disorders or diseases in people who don't have any symptoms. The goal is to find a disease early so lifestyle changes can be made and you can be watched more closely to reduce the risk of disease, or to detect it early enough to treat it most effectively. Screening tests are not considered diagnostic, but are used to determine if more testing is needed. Health counseling is essential, too. Below are guidelines for these, for women ages 40 to 49. Talk with your healthcare provider to make sure you re up-to-date on what you need.  Screening Who needs it How often   Type 2 diabetes or prediabetes All women beginning at age 45 and women without symptoms at any age who are overweight or obese and have 1 or more additional risk factors for diabetes At least every 3 years1   Type 2 diabetes or prediabetes All women diagnosed with gestational diabetes Lifelong testing every 3 years   Type 2 diabetes All women with prediabetes Every year   Alcohol misuse All women in this age group At routine exams   Blood pressure All women in this age group Yearly checkup if your blood pressure is normal  Normal blood pressure is less than 120/80 mm Hg  If your blood pressure reading is higher than normal, follow the advice of your healthcare provider   Breast cancer All women at average risk in this age group Screening with a mammogram can start at age 40.2 Talk with your healthcare provider to help you decide when to start screening. At age 45 start yearly mammograms.3    Cervical cancer All women in this age group, except women who have had a complete hysterectomy Pap test every 3 years or Pap test plus human papilloma virus (HPV) test every 5 years   Chlamydia Women at increased risk for infection At routine exams if you're at risk or have symptoms   Depression All women in  this age group At routine exams   Gonorrhea Sexually active women at increased risk for infection At routine exams   Hepatitis C Anyone at increased risk; 1 time for those born between 1945 and 1965 At routine exams   High cholesterol or triglycerides All women ages 45 and older who are at risk for coronary artery disease; younger women, talk with your healthcare provider At least every 5 years   HIV All women At routine exams. Those with risk factors for HIV should be tested at least annually.   Obesity All women in this age group At routine exams   Syphilis Women at increased risk for infection-talk with your healthcare provider At routine exams   Tuberculosis Women at increased risk for infection-talk with your healthcare provider Ask your healthcare provider   Vision All women in this age group Complete exam at age 40 and eye exams every 2 to 4 years. If you have a chronic disease, ask your healthcare provider how often you should have your eyes examined.4   Vaccine Who needs it How often   Chickenpox (varicella) All women in this age group who have no record of this infection or vaccine 2 doses; the second dose should be given at least 4 weeks after the first dose   Hepatitis A Women at increased risk for infection-talk with your healthcare provider 2 doses given 6 months apart   Hepatitis B Women at increased risk for infection-talk with your healthcare provider 3 doses over 6 months; second dose should be given 1 month after the first dose; the third dose should be given at least 2 months after the second dose and at least 4 months after the first dose   Haemophilus influenzae Type B (HIB) Women at increased risk 1 to 3 doses   Influenza (flu) All women in this age group Once a year   Measles, mumps, rubella (MMR) All women in this age group who have no record of these infections or vaccines 1 or 2 doses   Meningococcal Women at increased risk for infection-talk with your healthcare provider 1 or more doses    Pneumococcal conjugate vaccine (PCV13) and pneumococcal polysaccharide vaccine (PPSV23) Women at increased risk for infection-talk with your healthcare provider 1 or 2 doses   Tetanus/diphtheria/pertussis (Td/Tdap) booster All women in this age group A one-time dose of Tdap instead of a Td booster after age 18, then Td every 10 years   Counseling Who needs it How often   BRCA gene mutation testing for breast and ovarian cancer susceptibility Women with increased risk for having gene mutation When your risk is known   Breast cancer and chemoprevention Women at high risk for breast cancer When your risk is known   Diet and exercise Women who are overweight or obese When diagnosed, and then at routine exams   Domestic violence Women at the age in which they are able to have children At routine exams   Sexually transmitted infection prevention Women at increased risk for infection-talk with your healthcare provider At routine exams   Use of tobacco and the health effects it can cause All women in this age group Every exam   1American Diabetes Association  2[facility] College of Obstetricians and Gynecologists   3American Cancer Society  4[facility] Academy of Ophthalmology  Date Last Reviewed: 11/1/2017 2000-2017 [employer]. [address] [place] [address]. All rights reserved. This information is not intended as a substitute for professional medical care. Always follow your healthcare professional's instructions.

## 2018-10-18 NOTE — LETTER
October 30, 2018    Hyacinth Powell  [address] [place] [address]    Dear Hyacinth,  We are happy to inform you that your PAP smear result is normal.  We are now able to do a follow up test on PAP smears. The DNA test is for HPV (Human Papilloma Virus). Cervical cancer is closely linked with certain types of HPV. Your results showed no evidence of high risk HPV.  Therefore we recommend you return in 5 years for your next pap smear and HPV test.  You will still need to return to the clinic every year for an annual exam and other preventive tests.  Sincerely,    Christiane Rosales MD

## 2018-10-18 NOTE — PROGRESS NOTES
"GYNECOLOGY OFFICE NOTE    SUBJECTIVE:  Hyacinth Powell  is a 48 year old female  female here today for PAP only.  Her insurance does not cover physical exams.  Had her mammogram in September.     LMP: Patient's last menstrual period was 10/06/2018.  Menstrual Regularity: regular and normal in flow        ALLERGIES:  Naproxen and Penicillins      Past Medical History:   Diagnosis Date     Personal history of other medical treatment (CODE)     , 1 Spont miscarriage, 2 term vaginal     Puerperal psychosis     No Comments Provided     Past Surgical History:   Procedure Laterality Date     OTHER SURGICAL HISTORY      ,ANAL SURGERY,Anal fistula-Dr White     Patient Active Problem List   Diagnosis     Family history of malignant neoplasm of breast     Allergic rhinitis     Hypertrophy of breast     History of major depression     Obesity     Social History     Social History     Marital status:      Spouse name: N/A     Number of children: N/A     Years of education: N/A     Occupational History     Not on file.     Social History Main Topics     Smoking status: Never Smoker     Smokeless tobacco: Never Used     Alcohol use 0.0 oz/week      Comment: Alcoholic Drinks/day: Rarely     Drug use: No      Comment: Drug use: No     Sexual activity: Yes     Partners: Male      Comment: Birth control method: Spouse-vas     Other Topics Concern     Not on file     Social History Narrative    Moved to  Summer 2011 to help care for aging parents.       Children:  James 2004, Buddy  2010  Spouse works as para for the school [employer]  Works at [employer]   Mother lives with them              PHYSICAL EXAM:   Vitals: /76  Pulse 72  Temp 97.3  F (36.3  C)  Ht 5' 2.75\" (1.594 m)  Wt 236 lb 9.6 oz (107.3 kg)  LMP 10/06/2018  Breastfeeding? No  BMI 42.25 kg/m2  BMI= Body mass index is 42.25 kg/(m^2).  Wt Readings from Last 3 Encounters:   10/18/18 236 lb 9.6 oz (107.3 kg) "   02/16/18 230 lb 6 oz (104.5 kg)   06/22/17 225 lb 6.4 oz (102.2 kg)         General Appearance: Pleasant, alert, appropriate appearance for age. No acute distress  Genitourinary Exam: Female  External Gyn:  Pubic hair is normally distributed.  Labia/clitoris is unremarkable. Glands do appear to be normal.  Perineum is unremarkable.  No perianal abnormalities. Urethra is normal in appearance, without erythema.  Urethral meatus is normal.  Internal Gyn:   Vaginal mucosa appears normal. Cervix normal to inspection and Pap with HPV obtained, minimal contact bleeding.  Uterus Normal shape, position and consistency.  Adnexal exam is prohibited by body habitus and I cannot assess the adnexa.  No significant vaginal discharge.    ASSESSMENT/PLAN:  1. Pap smear for cervical cancer screening    2. Encounter for immunization    3. Class 3 severe obesity due to excess calories without serious comorbidity with body mass index (BMI) of 40.0 to 44.9 in adult (H)    Plan:  Discussed weight and using a Fitbit and counting steps and current job has increased walking and stairs.  Influenza vaccine requested and given.  Follow-up will depend on Pap result.  Labs done in 2017 and none are really indicated at this time.  Christiane Rosales MD  8:19 AM 10/18/2018   Portions of this dictation were created using the Dragon Nuance voice recognition system. Proofreading was completed but there may be errors in text.

## 2018-10-18 NOTE — MR AVS SNAPSHOT
After Visit Summary   10/18/2018    Hyacinth Powell    MRN: [id number]           Patient Information     Date Of Birth          1970        Visit Information        Provider Department      10/18/2018 7:45 AM Christiane Rosales MD [facility]        Today's Diagnoses     Pap smear for cervical cancer screening    -  1    Encounter for immunization        Class 3 severe obesity due to excess calories without serious comorbidity with body mass index (BMI) of 40.0 to 44.9 in adult (H)          Care Instructions      Prevention Guidelines, Women Ages 40 to 49  Screening tests and vaccines are an important part of managing your health. A screening test is done to find possible disorders or diseases in people who don't have any symptoms. The goal is to find a disease early so lifestyle changes can be made and you can be watched more closely to reduce the risk of disease, or to detect it early enough to treat it most effectively. Screening tests are not considered diagnostic, but are used to determine if more testing is needed. Health counseling is essential, too. Below are guidelines for these, for women ages 40 to 49. Talk with your healthcare provider to make sure you re up-to-date on what you need.  Screening Who needs it How often   Type 2 diabetes or prediabetes All women beginning at age 45 and women without symptoms at any age who are overweight or obese and have 1 or more additional risk factors for diabetes At least every 3 years1   Type 2 diabetes or prediabetes All women diagnosed with gestational diabetes Lifelong testing every 3 years   Type 2 diabetes All women with prediabetes Every year   Alcohol misuse All women in this age group At routine exams   Blood pressure All women in this age group Yearly checkup if your blood pressure is normal  Normal blood pressure is less than 120/80 mm Hg  If your blood pressure reading is higher than normal, follow the advice of your  healthcare provider   Breast cancer All women at average risk in this age group Screening with a mammogram can start at age 40.2 Talk with your healthcare provider to help you decide when to start screening. At age 45 start yearly mammograms.3    Cervical cancer All women in this age group, except women who have had a complete hysterectomy Pap test every 3 years or Pap test plus human papilloma virus (HPV) test every 5 years   Chlamydia Women at increased risk for infection At routine exams if you're at risk or have symptoms   Depression All women in this age group At routine exams   Gonorrhea Sexually active women at increased risk for infection At routine exams   Hepatitis C Anyone at increased risk; 1 time for those born between 1945 and 1965 At routine exams   High cholesterol or triglycerides All women ages 45 and older who are at risk for coronary artery disease; younger women, talk with your healthcare provider At least every 5 years   HIV All women At routine exams. Those with risk factors for HIV should be tested at least annually.   Obesity All women in this age group At routine exams   Syphilis Women at increased risk for infection-talk with your healthcare provider At routine exams   Tuberculosis Women at increased risk for infection-talk with your healthcare provider Ask your healthcare provider   Vision All women in this age group Complete exam at age 40 and eye exams every 2 to 4 years. If you have a chronic disease, ask your healthcare provider how often you should have your eyes examined.4   Vaccine Who needs it How often   Chickenpox (varicella) All women in this age group who have no record of this infection or vaccine 2 doses; the second dose should be given at least 4 weeks after the first dose   Hepatitis A Women at increased risk for infection-talk with your healthcare provider 2 doses given 6 months apart   Hepatitis B Women at increased risk for infection-talk with your healthcare provider 3  doses over 6 months; second dose should be given 1 month after the first dose; the third dose should be given at least 2 months after the second dose and at least 4 months after the first dose   Haemophilus influenzae Type B (HIB) Women at increased risk 1 to 3 doses   Influenza (flu) All women in this age group Once a year   Measles, mumps, rubella (MMR) All women in this age group who have no record of these infections or vaccines 1 or 2 doses   Meningococcal Women at increased risk for infection-talk with your healthcare provider 1 or more doses   Pneumococcal conjugate vaccine (PCV13) and pneumococcal polysaccharide vaccine (PPSV23) Women at increased risk for infection-talk with your healthcare provider 1 or 2 doses   Tetanus/diphtheria/pertussis (Td/Tdap) booster All women in this age group A one-time dose of Tdap instead of a Td booster after age 18, then Td every 10 years   Counseling Who needs it How often   BRCA gene mutation testing for breast and ovarian cancer susceptibility Women with increased risk for having gene mutation When your risk is known   Breast cancer and chemoprevention Women at high risk for breast cancer When your risk is known   Diet and exercise Women who are overweight or obese When diagnosed, and then at routine exams   Domestic violence Women at the age in which they are able to have children At routine exams   Sexually transmitted infection prevention Women at increased risk for infection-talk with your healthcare provider At routine exams   Use of tobacco and the health effects it can cause All women in this age group Every exam   1American Diabetes Association  2American College of Obstetricians and Gynecologists   3American Cancer Society  4American Academy of Ophthalmology  Date Last Reviewed: 11/1/2017 2000-2017 [employer], "[employer]. [address] [place] [address]. All rights reserved. This information is not intended as a substitute for professional medical  "care. Always follow your healthcare professional's instructions.                Follow-ups after your visit        Who to contact     If you have questions or need follow up information about today's clinic visit or your schedule please contact [facility] directly at [phone].  Normal or non-critical lab and imaging results will be communicated to you by Lawdingohart, letter or phone within 4 business days after the clinic has received the results. If you do not hear from us within 7 days, please contact the clinic through Lawdingohart or phone. If you have a critical or abnormal lab result, we will notify you by phone as soon as possible.  Submit refill requests through [employer]iField or call your pharmacy and they will forward the refill request to us. Please allow 3 business days for your refill to be completed.          Additional Information About Your Visit        Lawdingohart Information     [employer] gives you secure access to your electronic health record. If you see a primary care provider, you can also send messages to your care team and make appointments. If you have questions, please call your primary care clinic.  If you do not have a primary care provider, please call [phone] and they will assist you.        Care EveryWhere ID     This is your Care EveryWhere ID. This could be used by other organizations to access your [facility] medical records  [id number]        Your Vitals Were     Pulse Temperature Height Last Period Breastfeeding? BMI (Body Mass Index)    72 97.3  F (36.3  C) 5' 2.75\" (1.594 m) 10/06/2018 No 42.25 kg/m2       Blood Pressure from Last 3 Encounters:   10/18/18 122/76   02/16/18 136/78   06/22/17 128/80    Weight from Last 3 Encounters:   10/18/18 236 lb 9.6 oz (107.3 kg)   02/16/18 230 lb 6 oz (104.5 kg)   06/22/17 225 lb 6.4 oz (102.2 kg)              We Performed the Following     GH IMM-  HC FLU VAC PRESRV FREE QUAD SPLIT VIR 3+YRS IM     HPV High Risk Types DNA Cervical  "    Pap Screen Thin Prep with HPV - recommended age 30 - 65 years (select HPV order below)        Primary Care Provider Office Phone # Fax #    Christiane Rosales [phone]       [address]   [facility] [address]        Equal Access to Services     [facility] JOLENE : Hadii nona trevizo haddaniela Soomaali, waaxda luqadaha, qaybta kaalmada salegyada, rhonda pena. So [facility] [phone].    ATENCIÓN: Si habla español, tiene a guillaume disposición servicios gratuitos de asistencia lingüística. Llame al [phone].    We comply with applicable federal civil rights laws and [place] laws. We do not discriminate on the basis of race, color, national origin, age, disability, sex, sexual orientation, or gender identity.            Thank you!     Thank you for choosing [facility]  for your care. Our goal is always to provide you with excellent care. Hearing back from our patients is one way we can continue to improve our services. Please take a few minutes to complete the written survey that you may receive in the mail after your visit with us. Thank you!             Your Updated Medication List - Protect others around you: Learn how to safely use, store and throw away your medicines at www.disposemymeds.org.      Notice  As of 10/18/2018  8:22 AM    You have not been prescribed any medications.

## 2018-10-31 ENCOUNTER — HEALTH MAINTENANCE LETTER (OUTPATIENT)
Age: 48
End: 2018-10-31

## 2019-03-25 ENCOUNTER — TELEPHONE (OUTPATIENT)
Dept: FAMILY MEDICINE | Facility: OTHER | Age: 49
End: 2019-03-25

## 2019-03-25 NOTE — TELEPHONE ENCOUNTER
Patient states that she would like to check on her son logans immunizations.  Marianna Treviño ....................  3/25/2019   1:28 PM

## 2019-03-25 NOTE — TELEPHONE ENCOUNTER
Patient called wanting to know when she last had her Tdao because MyChart is showing that it is due. According to [facility] her last Tdap was 1/23/2008. Patient is wondering why she wasn't offered one at her last appointment with Dr. Rosales. Patient also has some questions regarding her son James's immunizations - MMR and HPV.    Ana Lilia Cavazos on 3/25/2019 at 12:48 PM

## 2019-03-25 NOTE — TELEPHONE ENCOUNTER
Left message for patient that she was not quite due for her Tdap according to Dr. Rosales and also that she can make appointment with injection nurse for any injections that she may need.  Marianna Treviño ....................  3/25/2019   1:06 PM

## 2019-04-04 ENCOUNTER — ALLIED HEALTH/NURSE VISIT (OUTPATIENT)
Dept: FAMILY MEDICINE | Facility: OTHER | Age: 49
End: 2019-04-04
Attending: FAMILY MEDICINE
Payer: COMMERCIAL

## 2019-04-04 DIAGNOSIS — Z23 NEED FOR VACCINATION: Primary | ICD-10-CM

## 2019-04-04 PROCEDURE — 90714 TD VACC NO PRESV 7 YRS+ IM: CPT

## 2019-04-04 PROCEDURE — 90471 IMMUNIZATION ADMIN: CPT

## 2019-04-04 NOTE — PROGRESS NOTES
Pt denies allergies to yeast gelatin neosporin eggs thimerasol or latex or past reactions to vaccinations. Verified name and date of birth  Copy of [facility] given.  Herlinda Hurtado RN on 4/4/2019 at 3:29 PM

## 2019-08-09 ENCOUNTER — OFFICE VISIT (OUTPATIENT)
Dept: FAMILY MEDICINE | Facility: OTHER | Age: 49
End: 2019-08-09
Attending: FAMILY MEDICINE
Payer: COMMERCIAL

## 2019-08-09 VITALS
DIASTOLIC BLOOD PRESSURE: 80 MMHG | HEART RATE: 76 BPM | WEIGHT: 238.25 LBS | SYSTOLIC BLOOD PRESSURE: 122 MMHG | HEIGHT: 62 IN | BODY MASS INDEX: 43.84 KG/M2 | TEMPERATURE: 97.5 F | RESPIRATION RATE: 18 BRPM

## 2019-08-09 DIAGNOSIS — Z13.1 SCREENING FOR DIABETES MELLITUS: ICD-10-CM

## 2019-08-09 DIAGNOSIS — M25.50 MULTIPLE JOINT PAIN: Primary | ICD-10-CM

## 2019-08-09 DIAGNOSIS — Z12.39 SCREENING FOR BREAST CANCER: ICD-10-CM

## 2019-08-09 DIAGNOSIS — Z00.00 HEALTH CARE MAINTENANCE: ICD-10-CM

## 2019-08-09 DIAGNOSIS — Z13.0 SCREENING FOR DEFICIENCY ANEMIA: ICD-10-CM

## 2019-08-09 DIAGNOSIS — Z13.29 SCREENING FOR THYROID DISORDER: ICD-10-CM

## 2019-08-09 DIAGNOSIS — Z13.220 SCREENING FOR LIPID DISORDERS: ICD-10-CM

## 2019-08-09 LAB
ALBUMIN SERPL-MCNC: 4.4 G/DL (ref 3.5–5.7)
ALP SERPL-CCNC: 57 U/L (ref 34–104)
ALT SERPL W P-5'-P-CCNC: 25 U/L (ref 7–52)
ANION GAP SERPL CALCULATED.3IONS-SCNC: 6 MMOL/L (ref 3–14)
AST SERPL W P-5'-P-CCNC: 24 U/L (ref 13–39)
BASOPHILS # BLD AUTO: 0 10E9/L (ref 0–0.2)
BASOPHILS NFR BLD AUTO: 0.5 %
BILIRUB SERPL-MCNC: 0.5 MG/DL (ref 0.3–1)
BUN SERPL-MCNC: 13 MG/DL (ref 7–25)
CALCIUM SERPL-MCNC: 9.3 MG/DL (ref 8.6–10.3)
CHLORIDE SERPL-SCNC: 105 MMOL/L (ref 98–107)
CHOLEST SERPL-MCNC: 162 MG/DL
CO2 SERPL-SCNC: 28 MMOL/L (ref 21–31)
CREAT SERPL-MCNC: 0.72 MG/DL (ref 0.6–1.2)
DIFFERENTIAL METHOD BLD: NORMAL
EOSINOPHIL # BLD AUTO: 0.1 10E9/L (ref 0–0.7)
EOSINOPHIL NFR BLD AUTO: 1.5 %
ERYTHROCYTE [DISTWIDTH] IN BLOOD BY AUTOMATED COUNT: 12.8 % (ref 10–15)
ERYTHROCYTE [SEDIMENTATION RATE] IN BLOOD BY WESTERGREN METHOD: 7 MM/H (ref 1–15)
GFR SERPL CREATININE-BSD FRML MDRD: 87 ML/MIN/{1.73_M2}
GLUCOSE SERPL-MCNC: 94 MG/DL (ref 70–105)
HCT VFR BLD AUTO: 39.5 % (ref 35–47)
HDLC SERPL-MCNC: 44 MG/DL (ref 23–92)
HGB BLD-MCNC: 13.5 G/DL (ref 11.7–15.7)
IMM GRANULOCYTES # BLD: 0 10E9/L (ref 0–0.4)
IMM GRANULOCYTES NFR BLD: 0.2 %
LDLC SERPL CALC-MCNC: 97 MG/DL
LYMPHOCYTES # BLD AUTO: 2.6 10E9/L (ref 0.8–5.3)
LYMPHOCYTES NFR BLD AUTO: 30.5 %
MCH RBC QN AUTO: 29.9 PG (ref 26.5–33)
MCHC RBC AUTO-ENTMCNC: 34.2 G/DL (ref 31.5–36.5)
MCV RBC AUTO: 88 FL (ref 78–100)
MONOCYTES # BLD AUTO: 0.6 10E9/L (ref 0–1.3)
MONOCYTES NFR BLD AUTO: 6.6 %
NEUTROPHILS # BLD AUTO: 5.1 10E9/L (ref 1.6–8.3)
NEUTROPHILS NFR BLD AUTO: 60.7 %
NONHDLC SERPL-MCNC: 118 MG/DL
PLATELET # BLD AUTO: 252 10E9/L (ref 150–450)
POTASSIUM SERPL-SCNC: 3.7 MMOL/L (ref 3.5–5.1)
PROT SERPL-MCNC: 7.1 G/DL (ref 6.4–8.9)
RBC # BLD AUTO: 4.51 10E12/L (ref 3.8–5.2)
RHEUMATOID FACT SER NEPH-ACNC: <14 IU/ML (ref 0–20)
SODIUM SERPL-SCNC: 139 MMOL/L (ref 134–144)
TRIGL SERPL-MCNC: 107 MG/DL
TSH SERPL DL<=0.05 MIU/L-ACNC: 1.86 IU/ML (ref 0.34–5.6)
URATE SERPL-MCNC: 5.6 MG/DL (ref 4.4–7.6)
WBC # BLD AUTO: 8.5 10E9/L (ref 4–11)

## 2019-08-09 PROCEDURE — 80061 LIPID PANEL: CPT | Mod: ZL | Performed by: FAMILY MEDICINE

## 2019-08-09 PROCEDURE — 86431 RHEUMATOID FACTOR QUANT: CPT | Mod: ZL | Performed by: FAMILY MEDICINE

## 2019-08-09 PROCEDURE — 36415 COLL VENOUS BLD VENIPUNCTURE: CPT | Mod: ZL | Performed by: FAMILY MEDICINE

## 2019-08-09 PROCEDURE — 85652 RBC SED RATE AUTOMATED: CPT | Mod: ZL | Performed by: FAMILY MEDICINE

## 2019-08-09 PROCEDURE — 99396 PREV VISIT EST AGE 40-64: CPT | Performed by: FAMILY MEDICINE

## 2019-08-09 PROCEDURE — 86038 ANTINUCLEAR ANTIBODIES: CPT | Mod: ZL | Performed by: FAMILY MEDICINE

## 2019-08-09 PROCEDURE — 85025 COMPLETE CBC W/AUTO DIFF WBC: CPT | Mod: ZL | Performed by: FAMILY MEDICINE

## 2019-08-09 PROCEDURE — 80053 COMPREHEN METABOLIC PANEL: CPT | Mod: ZL | Performed by: FAMILY MEDICINE

## 2019-08-09 PROCEDURE — 84550 ASSAY OF BLOOD/URIC ACID: CPT | Mod: ZL | Performed by: FAMILY MEDICINE

## 2019-08-09 PROCEDURE — 86618 LYME DISEASE ANTIBODY: CPT | Mod: ZL | Performed by: FAMILY MEDICINE

## 2019-08-09 PROCEDURE — 84443 ASSAY THYROID STIM HORMONE: CPT | Mod: ZL | Performed by: FAMILY MEDICINE

## 2019-08-09 PROCEDURE — 86200 CCP ANTIBODY: CPT | Mod: ZL | Performed by: FAMILY MEDICINE

## 2019-08-09 ASSESSMENT — ENCOUNTER SYMPTOMS
COUGH: 0
COLOR CHANGE: 0
NERVOUS/ANXIOUS: 0
FEVER: 0
ARTHRALGIAS: 1
CHILLS: 0

## 2019-08-09 ASSESSMENT — PAIN SCALES - GENERAL: PAINLEVEL: MODERATE PAIN (4)

## 2019-08-09 ASSESSMENT — ANXIETY QUESTIONNAIRES
1. FEELING NERVOUS, ANXIOUS, OR ON EDGE: NOT AT ALL
GAD7 TOTAL SCORE: 0
6. BECOMING EASILY ANNOYED OR IRRITABLE: NOT AT ALL
IF YOU CHECKED OFF ANY PROBLEMS ON THIS QUESTIONNAIRE, HOW DIFFICULT HAVE THESE PROBLEMS MADE IT FOR YOU TO DO YOUR WORK, TAKE CARE OF THINGS AT HOME, OR GET ALONG WITH OTHER PEOPLE: NOT DIFFICULT AT ALL
7. FEELING AFRAID AS IF SOMETHING AWFUL MIGHT HAPPEN: NOT AT ALL
3. WORRYING TOO MUCH ABOUT DIFFERENT THINGS: NOT AT ALL
5. BEING SO RESTLESS THAT IT IS HARD TO SIT STILL: NOT AT ALL
2. NOT BEING ABLE TO STOP OR CONTROL WORRYING: NOT AT ALL

## 2019-08-09 ASSESSMENT — MIFFLIN-ST. JEOR: SCORE: 1672.91

## 2019-08-09 ASSESSMENT — PATIENT HEALTH QUESTIONNAIRE - PHQ9
5. POOR APPETITE OR OVEREATING: NOT AT ALL
SUM OF ALL RESPONSES TO PHQ QUESTIONS 1-9: 0

## 2019-08-09 NOTE — PATIENT INSTRUCTIONS
Check with your insurance to see if the Tdap would be covered if you were just given a Td this year.

## 2019-08-09 NOTE — NURSING NOTE
"Patient presents to the clinic for annual physical.      Chief Complaint   Patient presents with     Physical       Initial /80 (BP Location: Right arm, Patient Position: Sitting, Cuff Size: Adult Regular)   Pulse 76   Temp 97.5  F (36.4  C) (Tympanic)   Resp 18   Ht 1.581 m (5' 2.25\")   Wt 108.1 kg (238 lb 4 oz)   LMP 08/05/2019 (Exact Date)   BMI 43.23 kg/m   Estimated body mass index is 43.23 kg/m  as calculated from the following:    Height as of this encounter: 1.581 m (5' 2.25\").    Weight as of this encounter: 108.1 kg (238 lb 4 oz).  Medication Reconciliation: complete    Roxy Montes LPN    "

## 2019-08-09 NOTE — PROGRESS NOTES
"  SUBJECTIVE:   Nursing Notes:   Roxy Montes LPN  2019 11:11 AM  Sign at exiting of workspace  Patient presents to the clinic for annual physical.      Chief Complaint   Patient presents with     Physical       Initial /80 (BP Location: Right arm, Patient Position: Sitting, Cuff Size: Adult Regular)   Pulse 76   Temp 97.5  F (36.4  C) (Tympanic)   Resp 18   Ht 1.581 m (5' 2.25\")   Wt 108.1 kg (238 lb 4 oz)   LMP 2019 (Exact Date)   BMI 43.23 kg/m    Estimated body mass index is 43.23 kg/m  as calculated from the following:    Height as of this encounter: 1.581 m (5' 2.25\").    Weight as of this encounter: 108.1 kg (238 lb 4 oz).  Medication Reconciliation: complete    Roxy Montes LPN      Hyacinth Powell is a 49 year old female who presents to clinic today for a physical and to establish care.    Has a lot of arthritis type of pain in multiple joints.  Denies any swelling, redness or warmth.  No family history of inflammatory arthritis.  No past history of psoriasis.    HPI    I personally reviewed medications/allergies/history listed below:    Patient Active Problem List    Diagnosis Date Noted     History of major depression 2015     Priority: Medium     Allergic rhinitis 2014     Priority: Medium     Hypertrophy of breast 2012     Priority: Medium     Family history of malignant neoplasm of breast 2012     Priority: Medium     Obesity 2012     Priority: Medium     Past Medical History:   Diagnosis Date     Personal history of other medical treatment (CODE)     , 1 Spont miscarriage, 2 term vaginal     Postpartum depression     No Comments Provided      Past Surgical History:   Procedure Laterality Date     OTHER SURGICAL HISTORY      ,,ANAL SURGERY,Anal fistula-Dr White     Family History   Problem Relation Age of Onset     Breast Cancer Mother         Cancer-breast diagnosed in her 70s     Diabetes Mother         Diabetes,Type 2     " "Heart Disease Father         Heart Disease,CAD/s/p CABG,  of CHF     Congenital heart disease Brother          as a      Family History Negative Brother      Thyroid Disease Sister      Family History Negative Son         Good Health,     Family History Negative Son         Good Health,     Other - See Comments Sister         Cerebral aneurysm     Social History     Tobacco Use     Smoking status: Never Smoker     Smokeless tobacco: Never Used   Substance Use Topics     Alcohol use: Not Currently     Alcohol/week: 0.0 oz     Comment: rare 6 drinks per year     Social History     Social History Narrative    Moved to  Summer 2011 to help care for aging parents.      - Nel     Children:  James , Buddy    Spouse works as  in the school district  through [employer].    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 Her mother lives with them.     No current outpatient medications on file.     Allergies   Allergen Reactions     Naproxen GI Disturbance     Penicillins Diarrhea       Review of Systems   Constitutional: Negative for chills and fever.   Respiratory: Negative for cough.    Musculoskeletal: Positive for arthralgias.   Skin: Negative for color change.   Psychiatric/Behavioral: Negative for mood changes. The patient is not nervous/anxious.         OBJECTIVE:     /80 (BP Location: Right arm, Patient Position: Sitting, Cuff Size: Adult Regular)   Pulse 76   Temp 97.5  F (36.4  C) (Tympanic)   Resp 18   Ht 1.581 m (5' 2.25\")   Wt 108.1 kg (238 lb 4 oz)   LMP 2019 (Exact Date)   BMI 43.23 kg/m    Body mass index is 43.23 kg/m .  Physical Exam   Constitutional: She is oriented to person, place, and time. She appears well-developed and well-nourished. No distress.   HENT:   Head: Normocephalic.   Right Ear: Tympanic membrane and external ear normal.   Left Ear: Tympanic membrane and external ear normal.   Nose: Nose normal. "   Mouth/Throat: Oropharynx is clear and moist. No oropharyngeal exudate.   Eyes: Pupils are equal, round, and reactive to light. Conjunctivae are normal. Right eye exhibits no discharge. Left eye exhibits no discharge.   Neck: Neck supple. No tracheal deviation present. No thyromegaly present.   Cardiovascular: Normal rate, regular rhythm, S1 normal, S2 normal, normal heart sounds, intact distal pulses and normal pulses. Exam reveals no gallop, no S3, no S4 and no friction rub.   No murmur heard.  Pulmonary/Chest: Effort normal and breath sounds normal. No respiratory distress. She has no wheezes. She has no rales. No breast tenderness or discharge.   Breast exam:  No masses palpable.  No skin changes, tethering or axillary lymphadenopathy.   Abdominal: Soft. Bowel sounds are normal. She exhibits no distension and no mass. There is no hepatosplenomegaly. There is no tenderness.   Genitourinary: No breast tenderness or discharge.   Genitourinary Comments: Pelvic/Rectal exams deferred per patient.   Musculoskeletal: Normal range of motion. She exhibits no edema.   No swelling, redness or warmth of joints of hands or elsewhere.   Lymphadenopathy:     She has no cervical adenopathy.   Neurological: She is alert and oriented to person, place, and time. She has normal strength and normal reflexes. She exhibits normal muscle tone.   Skin: Skin is warm and dry. No rash noted.   Psychiatric: She has a normal mood and affect. Judgment and thought content normal. Cognition and memory are normal.         PHQ-9 SCORE 2/16/2015 8/9/2019   PHQ-9 Total Score 6 0         LETICIA-7 SCORE 2/16/2015 8/9/2019   Total Score 17 0         I personally reviewed results withpatient as listed below:   Diagnostic Test Results:  none     ASSESSMENT/PLAN:       ICD-10-CM    1. Multiple joint pain M25.50 Rheumatoid factor     Lyme Disease Ab with reflex to WB Serum     Cyclic Citrullinated Peptide Antibody IgG     Sedimentation Rate (ESR)     Anti  Nuclear Tarah IgG by IFA with Reflex     Uric acid     Lyme Disease Ab with reflex to WB Serum     Uric acid     Sedimentation Rate (ESR)     Cyclic Citrullinated Peptide Antibody IgG     Rheumatoid factor   2. Screening for breast cancer Z12.31 MA Screen Bilateral w/Alfonso     CANCELED: MA Screen Bilateral w/Alfonso   3. Screening for lipid disorders Z13.220 Lipid Panel     Lipid Panel   4. Screening for diabetes mellitus Z13.1 Comprehensive Metabolic Panel     Comprehensive Metabolic Panel   5. Screening for thyroid disorder Z13.29 Thyrotropin GH     Thyrotropin GH   6. Screening for deficiency anemia Z13.0 CBC and Differential     CBC and Differential   7. Health care maintenance Z00.00        1.  Labs obtained as above.  If work up normal, likely osteoarthritis as the cause for most of her joint pain.  2.  Mammogram ordered.  3.  Lipid profile completed today.  4.  Comprehensive Metabolic Profile as above to screen for diabetes.  5.  TSH today as above.  6.  Complete Blood Count today as above.  7.  Mammogram as above.  Pap Smear/HPV are up to date - last completed 10/18/18.  Td is up to date - last completed 4/4/19.      Vernell Duff MD  [facility]

## 2019-08-10 ASSESSMENT — ANXIETY QUESTIONNAIRES: GAD7 TOTAL SCORE: 0

## 2019-08-12 LAB
ANA SER QL IF: NEGATIVE
B BURGDOR IGG+IGM SER QL: 0.16 (ref 0–0.89)
CCP AB SER IA-ACNC: 1 U/ML

## 2019-09-16 ENCOUNTER — HOSPITAL ENCOUNTER (OUTPATIENT)
Dept: MAMMOGRAPHY | Facility: OTHER | Age: 49
Discharge: HOME | End: 2019-09-16
Attending: FAMILY MEDICINE | Admitting: FAMILY MEDICINE
Payer: COMMERCIAL

## 2019-09-16 DIAGNOSIS — Z12.39 SCREENING FOR BREAST CANCER: ICD-10-CM

## 2019-09-16 PROCEDURE — 77063 BREAST TOMOSYNTHESIS BI: CPT

## 2019-10-01 ENCOUNTER — MYC MEDICAL ADVICE (OUTPATIENT)
Dept: FAMILY MEDICINE | Facility: OTHER | Age: 49
End: 2019-10-01
Payer: COMMERCIAL

## 2019-10-01 DIAGNOSIS — Z23 NEED FOR TDAP VACCINATION: Primary | ICD-10-CM

## 2019-10-01 DIAGNOSIS — Z23 NEEDS FLU SHOT: ICD-10-CM

## 2019-10-30 ENCOUNTER — ALLIED HEALTH/NURSE VISIT (OUTPATIENT)
Dept: FAMILY MEDICINE | Facility: OTHER | Age: 49
End: 2019-10-30
Attending: FAMILY MEDICINE
Payer: COMMERCIAL

## 2019-10-30 DIAGNOSIS — Z23 NEED FOR VACCINATION: Primary | ICD-10-CM

## 2019-10-30 PROCEDURE — 90471 IMMUNIZATION ADMIN: CPT

## 2019-10-30 PROCEDURE — 90715 TDAP VACCINE 7 YRS/> IM: CPT

## 2019-10-30 NOTE — PROGRESS NOTES
"Immunization Documentation  Verified patient's first and last name, and . Stated reason for visit today is to receive TDaP vaccine(s). Refused flu vaccine. Stated, \"I previously received that at school already.\" Denied any concerns with previous immunizations. Allergies reviewed. VIS handout(s) reviewed and given to take home. Vaccine order for TDaP previously placed by patient's PCP. Boostrix prepared and administered IM into left deltoid. Administration documented in IMMUNIZATIONS (see flowsheet and order for further information). Instructed to wait in lobby for 15 minutes post-injection and notify staff immediately of any reaction.        Vicki CORDONN, RN on 10/30/2019 at 11:12 AM          "

## 2019-12-23 ENCOUNTER — OFFICE VISIT (OUTPATIENT)
Dept: FAMILY MEDICINE | Facility: OTHER | Age: 49
End: 2019-12-23
Attending: FAMILY MEDICINE
Payer: COMMERCIAL

## 2019-12-23 VITALS
WEIGHT: 235 LBS | HEIGHT: 62 IN | TEMPERATURE: 97.2 F | OXYGEN SATURATION: 98 % | BODY MASS INDEX: 43.24 KG/M2 | HEART RATE: 90 BPM | DIASTOLIC BLOOD PRESSURE: 74 MMHG | SYSTOLIC BLOOD PRESSURE: 128 MMHG | RESPIRATION RATE: 16 BRPM

## 2019-12-23 DIAGNOSIS — B07.0 PLANTAR WARTS: ICD-10-CM

## 2019-12-23 DIAGNOSIS — M25.561 ACUTE PAIN OF RIGHT KNEE: Primary | ICD-10-CM

## 2019-12-23 PROCEDURE — 99213 OFFICE O/P EST LOW 20 MIN: CPT | Performed by: FAMILY MEDICINE

## 2019-12-23 ASSESSMENT — ENCOUNTER SYMPTOMS
WEAKNESS: 0
CHILLS: 0
NUMBNESS: 0
PARESTHESIAS: 0
FEVER: 0

## 2019-12-23 ASSESSMENT — MIFFLIN-ST. JEOR: SCORE: 1658.17

## 2019-12-23 ASSESSMENT — PAIN SCALES - GENERAL: PAINLEVEL: NO PAIN (0)

## 2019-12-23 NOTE — NURSING NOTE
"Chief Complaint   Patient presents with     Derm Problem     wart bottom Right Foot         Initial /74   Pulse 90   Temp 97.2  F (36.2  C) (Temporal)   Resp 16   Ht 1.581 m (5' 2.25\")   Wt 106.6 kg (235 lb)   LMP 12/12/2019   SpO2 98%   BMI 42.64 kg/m   Estimated body mass index is 42.64 kg/m  as calculated from the following:    Height as of this encounter: 1.581 m (5' 2.25\").    Weight as of this encounter: 106.6 kg (235 lb).    Medication Reconciliation: complete      Norma J. Gosselin, LPN  "

## 2019-12-23 NOTE — PROGRESS NOTES
SUBJECTIVE:   Hyacinth Powell is a 49 year old female who presents to clinic today for the following health issues:    HPI  Plantar Wart:  Located over bottom of right foot.  Has been present for past four months.  Has tried duct tape, home cryotherapy, and pumice stone without improvement.  Pain with pressure.  No erythema.    Right Knee Pain:  Slipped on ice and fell and landed on her knee on 19.  Reports that she is able to walk normally but has pain with stairs.  Has been resting and icing it.  Has also tried Advil.  ACE bandage for first couple of days after injury with improvement.    Past Medical History:   Diagnosis Date     Personal history of other medical treatment (CODE)     , 1 Spont miscarriage, 2 term vaginal     Postpartum depression     No Comments Provided      Past Surgical History:   Procedure Laterality Date     OTHER SURGICAL HISTORY      ,[id number],ANAL SURGERY,Anal fistula-Dr White     Family History   Problem Relation Age of Onset     Breast Cancer Mother         Cancer-breast diagnosed in her 70s     Diabetes Mother         Diabetes,Type 2     Heart Disease Father         Heart Disease,CAD/s/p CABG,  of CHF     Congenital heart disease Brother          as a      Family History Negative Brother      Thyroid Disease Sister      Family History Negative Son         Good Health,     Family History Negative Son         Good Health,     Other - See Comments Sister         Cerebral aneurysm     Social History     Tobacco Use     Smoking status: Never Smoker     Smokeless tobacco: Never Used   Substance Use Topics     Alcohol use: Not Currently     Alcohol/week: 0.0 standard drinks     Comment: rare 6 drinks per year     No current outpatient medications on file.     Allergies   Allergen Reactions     Naproxen GI Disturbance     Penicillins Diarrhea     Review of Systems   Constitutional: Negative for chills and fever.   Neurological: Negative for weakness,  "numbness and paresthesias.      OBJECTIVE:     /74   Pulse 90   Temp 97.2  F (36.2  C) (Temporal)   Resp 16   Ht 1.581 m (5' 2.25\")   Wt 106.6 kg (235 lb)   LMP 12/12/2019   SpO2 98%   BMI 42.64 kg/m    Body mass index is 42.64 kg/m .  Physical Exam  Constitutional:       Appearance: Normal appearance. She is obese.   Musculoskeletal:      Comments: No swelling or deformity of right knee.  Mild ecchymosis present medial and superior to patella.  No tenderness to palpation of patella, patellar ligament, or joint line.  No ligament laxity.   Skin:     Comments: 0.5 cm x 0.5 cm flat plantar wart present on sole of right foot surrounded by calloused skin.  No surrounding erythema.  No drainage.   Neurological:      Mental Status: She is alert.       ASSESSMENT/PLAN:     1. Acute pain of right knee  No concern for bony abnormality, meniscal injury, or ligament injury as etiology of pain.  Recommend continued conservative therapy with RICE and OTC NSAIDs.  Return if symptoms worsening or failing to improve as physical therapy or additional imaging may be indicated at that time.    2. Plantar warts  Advised soaking her foot to soften the skin and then using pumice stone to clear away callused skin prior to using OTC therapies.  She may also return to clinic for cryotherapy once callused skin cleared away.  Return if symptoms worsening or not improving and as needed.      Radha Geller DO  [facility]    "

## 2020-06-08 ENCOUNTER — OFFICE VISIT (OUTPATIENT)
Dept: FAMILY MEDICINE | Facility: OTHER | Age: 50
End: 2020-06-08
Attending: FAMILY MEDICINE
Payer: COMMERCIAL

## 2020-06-08 VITALS
WEIGHT: 244 LBS | OXYGEN SATURATION: 97 % | HEART RATE: 95 BPM | BODY MASS INDEX: 44.9 KG/M2 | TEMPERATURE: 99.1 F | SYSTOLIC BLOOD PRESSURE: 126 MMHG | HEIGHT: 62 IN | RESPIRATION RATE: 20 BRPM | DIASTOLIC BLOOD PRESSURE: 72 MMHG

## 2020-06-08 DIAGNOSIS — B07.0 PLANTAR WART: Primary | ICD-10-CM

## 2020-06-08 PROCEDURE — 17110 DESTRUCTION B9 LES UP TO 14: CPT | Performed by: FAMILY MEDICINE

## 2020-06-08 ASSESSMENT — ENCOUNTER SYMPTOMS
CHILLS: 0
COUGH: 0
FEVER: 0

## 2020-06-08 ASSESSMENT — MIFFLIN-ST. JEOR: SCORE: 1694

## 2020-06-08 ASSESSMENT — PAIN SCALES - GENERAL: PAINLEVEL: NO PAIN (0)

## 2020-06-08 NOTE — NURSING NOTE
Patient presents to clinic requesting treatment of right foot plantar wart.  Medication Reconciliation: complete    Meggan Wilson LPN

## 2020-06-08 NOTE — PROGRESS NOTES
SUBJECTIVE:   Nursing Notes:   Meggan Wilson LPN  2020  3:02 PM  Signed  Patient presents to clinic requesting treatment of right foot plantar wart.  Medication Reconciliation: complete    Meggan Wilson LPN      Hyacinth Powell is a 50 year old female who presents to clinic today for a complaint of a plantar wart on her right foot.  Has been present for several months.  Has tried over the counter duct tape, acid, compound w and nothing has helped.  Asked that this be treated with cryotherapy today.    HPI    I personally reviewed medications/allergies/history listed below:    Patient Active Problem List    Diagnosis Date Noted     History of major depression 2015     Priority: Medium     Allergic rhinitis 2014     Priority: Medium     Hypertrophy of breast 2012     Priority: Medium     Family history of malignant neoplasm of breast 2012     Priority: Medium     Obesity 2012     Priority: Medium     Past Medical History:   Diagnosis Date     Personal history of other medical treatment (CODE)     , 1 Spont miscarriage, 2 term vaginal     Postpartum depression     No Comments Provided      Past Surgical History:   Procedure Laterality Date     OTHER SURGICAL HISTORY      ,,ANAL SURGERY,Anal fistula-Dr White     Family History   Problem Relation Age of Onset     Breast Cancer Mother         Cancer-breast diagnosed in her 70s     Diabetes Mother         Diabetes,Type 2     Heart Disease Father         Heart Disease,CAD/s/p CABG,  of CHF     Congenital heart disease Brother          as a      Family History Negative Brother      Thyroid Disease Sister      Family History Negative Son         Good Health,     Family History Negative Son         Good Health,     Other - See Comments Sister         Cerebral aneurysm     Social History     Tobacco Use     Smoking status: Never Smoker     Smokeless tobacco: Never Used   Substance Use Topics      "Alcohol use: Not Currently     Alcohol/week: 0.0 standard drinks     Comment: rare 6 drinks per year     Social History     Social History Narrative    Moved to  Summer 2011 to help care for aging parents.      - Nel     Children:  James 2004, Buddy  2010  Spouse works as  in the school district  through [employer].    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 Her mother lives with them.     No current outpatient medications on file.     Allergies   Allergen Reactions     Naproxen GI Disturbance     Penicillins Diarrhea       Review of Systems   Constitutional: Negative for chills and fever.   Respiratory: Negative for cough.    Cardiovascular: Negative for peripheral edema.        OBJECTIVE:     /72 (BP Location: Right arm, Patient Position: Sitting, Cuff Size: Adult Large)   Pulse 95   Temp 99.1  F (37.3  C) (Tympanic)   Resp 20   Ht 1.581 m (5' 2.25\")   Wt 110.7 kg (244 lb)   LMP  (LMP Unknown)   SpO2 97%   Breastfeeding No   BMI 44.27 kg/m    Body mass index is 44.27 kg/m .  Physical Exam  Constitutional:       Appearance: Normal appearance.   Skin:     Comments: On the plantar surface of her right foot is a plantar wart with 1 tiny satellite wart adjacent to it.  These were frozen x3 in a freeze-thaw-freeze pattern with liquid nitrogen.  She tolerated this well.   Neurological:      Mental Status: She is alert.           PHQ-9 SCORE 2/16/2015 8/9/2019   PHQ-9 Total Score 6 0       PHQ-2 Score:     PHQ-2 ( 1999 [employer]) 6/8/2020 12/23/2019   Q1: Little interest or pleasure in doing things 0 0   Q2: Feeling down, depressed or hopeless 0 0   PHQ-2 Score 0 0       LETICIA-7 SCORE 2/16/2015 8/9/2019   Total Score 17 0         No flowsheet data found.      I personally reviewed results withpatient as listed below:   Diagnostic Test Results:  none     ASSESSMENT/PLAN:       ICD-10-CM    1. Plantar wart  B07.0 DESTRUCT BENIGN LESION, UP TO 14       1.  Wart " treated today as noted above.  If the wart persists beyond another month or so, she may present for retreatment.  Discussed over-the-counter treatments that can be continued to try to hasten resolution of this wart.    Vernell Duff MD  [facility]    Portions of this dictation were created using the Dragon Nuance voice recognition system. Proofreading was completed but there may be errors in text.

## 2020-11-09 ENCOUNTER — ALLIED HEALTH/NURSE VISIT (OUTPATIENT)
Dept: FAMILY MEDICINE | Facility: OTHER | Age: 50
End: 2020-11-09
Attending: FAMILY MEDICINE
Payer: COMMERCIAL

## 2020-11-09 DIAGNOSIS — Z23 NEED FOR PROPHYLACTIC VACCINATION AND INOCULATION AGAINST INFLUENZA: Primary | ICD-10-CM

## 2020-11-09 PROCEDURE — 90686 IIV4 VACC NO PRSV 0.5 ML IM: CPT

## 2020-11-09 PROCEDURE — 90471 IMMUNIZATION ADMIN: CPT

## 2020-12-27 ENCOUNTER — HEALTH MAINTENANCE LETTER (OUTPATIENT)
Age: 50
End: 2020-12-27

## 2021-01-22 ENCOUNTER — MYC MEDICAL ADVICE (OUTPATIENT)
Dept: FAMILY MEDICINE | Facility: OTHER | Age: 51
End: 2021-01-22

## 2021-03-06 ENCOUNTER — HEALTH MAINTENANCE LETTER (OUTPATIENT)
Age: 51
End: 2021-03-06

## 2021-03-20 ENCOUNTER — MYC MEDICAL ADVICE (OUTPATIENT)
Dept: FAMILY MEDICINE | Facility: OTHER | Age: 51
End: 2021-03-20

## 2021-05-26 ENCOUNTER — RECORDS - HEALTHEAST (OUTPATIENT)
Dept: ADMINISTRATIVE | Facility: CLINIC | Age: 51
End: 2021-05-26

## 2021-05-28 ENCOUNTER — RECORDS - HEALTHEAST (OUTPATIENT)
Dept: ADMINISTRATIVE | Facility: CLINIC | Age: 51
End: 2021-05-28

## 2021-05-30 ENCOUNTER — RECORDS - HEALTHEAST (OUTPATIENT)
Dept: ADMINISTRATIVE | Facility: CLINIC | Age: 51
End: 2021-05-30

## 2021-06-01 ENCOUNTER — RECORDS - HEALTHEAST (OUTPATIENT)
Dept: ADMINISTRATIVE | Facility: CLINIC | Age: 51
End: 2021-06-01

## 2021-07-13 ENCOUNTER — RECORDS - HEALTHEAST (OUTPATIENT)
Dept: ADMINISTRATIVE | Facility: CLINIC | Age: 51
End: 2021-07-13

## 2021-07-21 ENCOUNTER — RECORDS - HEALTHEAST (OUTPATIENT)
Dept: ADMINISTRATIVE | Facility: CLINIC | Age: 51
End: 2021-07-21

## 2021-10-09 ENCOUNTER — HEALTH MAINTENANCE LETTER (OUTPATIENT)
Age: 51
End: 2021-10-09

## 2021-10-20 ENCOUNTER — ALLIED HEALTH/NURSE VISIT (OUTPATIENT)
Dept: FAMILY MEDICINE | Facility: OTHER | Age: 51
End: 2021-10-20
Attending: FAMILY MEDICINE
Payer: COMMERCIAL

## 2021-10-20 DIAGNOSIS — Z23 NEED FOR PROPHYLACTIC VACCINATION AND INOCULATION AGAINST INFLUENZA: Primary | ICD-10-CM

## 2021-10-20 PROCEDURE — 90471 IMMUNIZATION ADMIN: CPT

## 2021-10-20 PROCEDURE — 90686 IIV4 VACC NO PRSV 0.5 ML IM: CPT

## 2022-01-29 ENCOUNTER — HEALTH MAINTENANCE LETTER (OUTPATIENT)
Age: 52
End: 2022-01-29

## 2022-02-01 ENCOUNTER — HOSPITAL ENCOUNTER (OUTPATIENT)
Dept: MAMMOGRAPHY | Facility: OTHER | Age: 52
End: 2022-02-01
Attending: FAMILY MEDICINE
Payer: COMMERCIAL

## 2022-02-01 ENCOUNTER — OFFICE VISIT (OUTPATIENT)
Dept: FAMILY MEDICINE | Facility: OTHER | Age: 52
End: 2022-02-01
Attending: FAMILY MEDICINE
Payer: COMMERCIAL

## 2022-02-01 VITALS
WEIGHT: 207 LBS | BODY MASS INDEX: 38.09 KG/M2 | HEART RATE: 81 BPM | HEIGHT: 62 IN | SYSTOLIC BLOOD PRESSURE: 118 MMHG | OXYGEN SATURATION: 98 % | RESPIRATION RATE: 12 BRPM | TEMPERATURE: 97.5 F | DIASTOLIC BLOOD PRESSURE: 70 MMHG

## 2022-02-01 DIAGNOSIS — M79.89 MASS OF SOFT TISSUE OF NECK: ICD-10-CM

## 2022-02-01 DIAGNOSIS — Z12.11 SCREEN FOR COLON CANCER: ICD-10-CM

## 2022-02-01 DIAGNOSIS — Z23 NEED FOR SHINGLES VACCINE: ICD-10-CM

## 2022-02-01 DIAGNOSIS — L30.9 ECZEMA, UNSPECIFIED TYPE: ICD-10-CM

## 2022-02-01 DIAGNOSIS — Z12.31 VISIT FOR SCREENING MAMMOGRAM: ICD-10-CM

## 2022-02-01 DIAGNOSIS — Z00.00 HEALTH CARE MAINTENANCE: Primary | ICD-10-CM

## 2022-02-01 PROCEDURE — 77067 SCR MAMMO BI INCL CAD: CPT

## 2022-02-01 PROCEDURE — 99396 PREV VISIT EST AGE 40-64: CPT | Performed by: FAMILY MEDICINE

## 2022-02-01 RX ORDER — CLOBETASOL PROPIONATE 0.5 MG/G
CREAM TOPICAL 2 TIMES DAILY PRN
Qty: 15 G | Refills: 3 | Status: SHIPPED | OUTPATIENT
Start: 2022-02-01 | End: 2023-02-23

## 2022-02-01 RX ORDER — DESONIDE 0.5 MG/G
CREAM TOPICAL 2 TIMES DAILY PRN
Qty: 15 G | Refills: 3 | Status: SHIPPED | OUTPATIENT
Start: 2022-02-01 | End: 2023-02-23

## 2022-02-01 ASSESSMENT — ENCOUNTER SYMPTOMS
COUGH: 0
CHILLS: 0
WOUND: 0
NERVOUS/ANXIOUS: 0
SHORTNESS OF BREATH: 0
FEVER: 0

## 2022-02-01 ASSESSMENT — MIFFLIN-ST. JEOR: SCORE: 1524.2

## 2022-02-01 ASSESSMENT — PAIN SCALES - GENERAL: PAINLEVEL: NO PAIN (0)

## 2022-02-01 NOTE — PROGRESS NOTES
SUBJECTIVE:   Nursing Notes:   Olimpia De La Torre  2/1/2022  8:33 AM  Signed  Chief Complaint   Patient presents with     Physical     Patient presents today for physical exam. States she has been having some derm issues around her neck, arms, eye lids, and spine. Believes it could be psoriasis. She also stated in March of 2020 she fell down the stairs and believes she received some whip lash on her neck. States there is a walnut size lump on her neck she would like looked at.     Medication Reconciliation: complete    Olimpia De La Torre        Hyacinth Powell is a 51 year old female who presents to clinic today for a physical.    In late spring, early summer, started getting a rash on her neck.  They are very itchy, round patches.  Have occurred on her eyelids and back as well at times.  She has used clobetasol cream, which works.  Her mom has a history of eczema.  Hyacinth has a history of seasonal allergies.    In 3/2020, she had fallen down stairs.  She fell down at least 3.  Fell onto her butt and bumped the back of her head.  Her neck got very stiff afterward.  Has had a lump in her neck ever since.  It is no longer painful, but still feels a lump on her neck.        Healthy Habits:     Getting at least 3 servings of Calcium per day:  Yes    Bi-annual eye exam:  Yes    Dental care twice a year:  Yes    Sleep apnea or symptoms of sleep apnea:  None    Diet:  Regular (no restrictions)    Frequency of exercise:  4-5 days/week    Duration of exercise:  30-45 minutes    Taking medications regularly:  Yes    Medication side effects:  Not applicable    PHQ-2 Total Score: 0    Additional concerns today:  Yes      I personally reviewed medications/allergies/history listed below:    Patient Active Problem List    Diagnosis Date Noted     History of major depression 08/06/2015     Priority: Medium     Allergic rhinitis 02/27/2014     Priority: Medium     Hypertrophy of breast 06/29/2012     Priority: Medium     Family history of  malignant neoplasm of breast 2012     Priority: Medium     Obesity 2012     Priority: Medium     Past Medical History:   Diagnosis Date     Personal history of other medical treatment (CODE)     , 1 Spont miscarriage, 2 term vaginal     Postpartum depression     No Comments Provided      Past Surgical History:   Procedure Laterality Date     OTHER SURGICAL HISTORY      ,35970,ANAL SURGERY,Anal fistula-Dr White     Family History   Problem Relation Age of Onset     Breast Cancer Mother         Cancer-breast diagnosed in her 70s     Diabetes Mother         Diabetes,Type 2     Heart Disease Father         Heart Disease,CAD/s/p CABG,  of CHF     Congenital heart disease Brother          as a      Family History Negative Brother      Thyroid Disease Sister      Family History Negative Son         Good Health,     Family History Negative Son         Good Health,     Other - See Comments Sister         Cerebral aneurysm     Social History     Tobacco Use     Smoking status: Never Smoker     Smokeless tobacco: Never Used   Substance Use Topics     Alcohol use: Not Currently     Alcohol/week: 0.0 standard drinks     Comment: rare 6 drinks per year     Social History     Social History Narrative    Moved to  Summer 2011 to help care for aging parents.      - Nel     Children:  James , Buddy    Spouse works as  in the school district  through [employer].    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 Her mother lives with them.     Current Outpatient Medications   Medication Sig Dispense Refill     clobetasol (TEMOVATE) 0.05 % external cream Apply topically 2 times daily as needed (itching, rash) 15 g 3     desonide (DESOWEN) 0.05 % external cream Apply topically 2 times daily as needed (rash on face) 15 g 3     other medical supplies Shingrix.  Diagnosis:  Z23. 1 each 1     Allergies   Allergen Reactions     Naproxen GI  "Disturbance     Penicillins Diarrhea       Review of Systems   Constitutional: Negative for chills and fever.   Respiratory: Negative for cough and shortness of breath.    Cardiovascular: Negative for peripheral edema.   Skin: Positive for rash. Negative for wound.   Psychiatric/Behavioral: Negative for mood changes. The patient is not nervous/anxious.         OBJECTIVE:     /70 (BP Location: Right arm, Patient Position: Sitting, Cuff Size: Adult Regular)   Pulse 81   Temp 97.5  F (36.4  C) (Tympanic)   Resp 12   Ht 1.586 m (5' 2.44\")   Wt 93.9 kg (207 lb)   LMP 01/10/2022 (Exact Date)   SpO2 98%   Breastfeeding No   BMI 37.33 kg/m    Body mass index is 37.33 kg/m .  Physical Exam  Constitutional:       General: She is not in acute distress.     Appearance: She is well-developed.   HENT:      Head: Normocephalic.      Right Ear: Tympanic membrane and external ear normal.      Left Ear: Tympanic membrane and external ear normal.      Nose: Nose normal.      Mouth/Throat:      Pharynx: No oropharyngeal exudate.   Eyes:      General:         Right eye: No discharge.         Left eye: No discharge.      Conjunctiva/sclera: Conjunctivae normal.      Pupils: Pupils are equal, round, and reactive to light.   Neck:      Thyroid: No thyromegaly.      Trachea: No tracheal deviation.   Cardiovascular:      Rate and Rhythm: Normal rate and regular rhythm.      Pulses: Normal pulses.      Heart sounds: Normal heart sounds, S1 normal and S2 normal. No murmur heard.  No friction rub. No gallop. No S3 or S4 sounds.    Pulmonary:      Effort: Pulmonary effort is normal. No respiratory distress.      Breath sounds: Normal breath sounds. No wheezing or rales.      Comments: Breast exam:  No masses palpable bilaterally.  No skin changes, tethering or axillary lymphadenopathy bilaterally.    Abdominal:      General: Bowel sounds are normal. There is no distension.      Palpations: Abdomen is soft. There is no mass.      " Tenderness: There is no abdominal tenderness.   Genitourinary:     Comments: Pelvic/Rectal exams deferred per patient.  Musculoskeletal:         General: Normal range of motion.      Cervical back: Neck supple.      Comments: On her left posterior neck, lateral to her spine and below her occipital region, there is a raised, soft, mobile soft tissue mass of about 3 cm in diameter.  There are no overlying skin changes.   Lymphadenopathy:      Cervical: No cervical adenopathy.   Skin:     General: Skin is warm and dry.      Findings: No rash.      Comments: On her right lateral neck, there is an approximate 1 cm circular, pink, slightly rough rash.  There is another similar area on her left neck.   Neurological:      Mental Status: She is alert and oriented to person, place, and time.      Motor: No abnormal muscle tone.      Deep Tendon Reflexes: Reflexes are normal and symmetric.   Psychiatric:         Thought Content: Thought content normal.         Judgment: Judgment normal.           PHQ-9 SCORE 2/16/2015 8/9/2019   PHQ-9 Total Score 6 0       PHQ-2 Score:     PHQ-2 ( 1999 Pfizer) 2/1/2022 6/8/2020   Q1: Little interest or pleasure in doing things 0 0   Q2: Feeling down, depressed or hopeless 0 0   PHQ-2 Score 0 0   PHQ-2 Total Score (12-17 Years)- Positive if 3 or more points; Administer PHQ-A if positive - 0   Q1: Little interest or pleasure in doing things Not at all -   Q2: Feeling down, depressed or hopeless Not at all -   PHQ-2 Score 0 -       LETICIA-7 SCORE 2/16/2015 8/9/2019   Total Score 17 0           I personally reviewed results withpatient as listed below:   Diagnostic Test Results:  none     ASSESSMENT/PLAN:       ICD-10-CM    1. Health care maintenance  Z00.00    2. Screen for colon cancer  Z12.11 COLOGUARD([employer])   3. Need for shingles vaccine  Z23 other medical supplies   4. Eczema, unspecified type  L30.9 clobetasol (TEMOVATE) 0.05 % external cream     desonide (DESOWEN) 0.05 % external  cream   5. Mass of soft tissue of neck  M79.89 US Head Neck Soft Tissue       1.  Mammogram was completed today.  Last Pap Smear on 10/18/18 was normal.  cologuard ordered.  tdap is up to date, last completed 4/4/19.  Covid vaccines are up to date.  Flu shot up to date, last completed 10/20/21.  2.  See #1.  3.  Prescription given to obtain shingrix at outside pharmacy.  4.  Gave prescription for clobetasol cream to use on eczema of her body, but recommended using lower potency cream on eyelids.  Prescription for desowen cream given.  Cautioned not to use this more than twice daily for a 2 week span continuously as skin atrophy would be a risk.  If not improving, could consider Dermatology referral.  5.  This may be a lipoma vs cyst vs other.  ultrasound of soft tissues of neck ordered.  If not enough information obtained with this test, could also consider an MRI.    Vernell Duff MD  [facility]

## 2022-02-01 NOTE — NURSING NOTE
Chief Complaint   Patient presents with     Physical     Patient presents today for physical exam. States she has been having some derm issues around her neck, arms, eye lids, and spine. Believes it could be psoriasis. She also stated in March of 2020 she fell down the stairs and believes she received some whip lash on her neck. States there is a walnut size lump on her neck she would like looked at.     Medication Reconciliation: maria eugenia De La Torre

## 2022-02-03 ENCOUNTER — HOSPITAL ENCOUNTER (OUTPATIENT)
Dept: ULTRASOUND IMAGING | Facility: OTHER | Age: 52
Discharge: HOME | End: 2022-02-03
Attending: FAMILY MEDICINE | Admitting: FAMILY MEDICINE
Payer: COMMERCIAL

## 2022-02-03 DIAGNOSIS — M79.89 MASS OF SOFT TISSUE OF NECK: ICD-10-CM

## 2022-02-03 PROCEDURE — 76536 US EXAM OF HEAD AND NECK: CPT

## 2022-02-04 ENCOUNTER — MYC MEDICAL ADVICE (OUTPATIENT)
Dept: FAMILY MEDICINE | Facility: OTHER | Age: 52
End: 2022-02-04
Payer: COMMERCIAL

## 2022-02-15 ENCOUNTER — MYC MEDICAL ADVICE (OUTPATIENT)
Dept: FAMILY MEDICINE | Facility: OTHER | Age: 52
End: 2022-02-15
Payer: COMMERCIAL

## 2022-02-15 DIAGNOSIS — D17.30 LIPOMA OF SKIN AND SUBCUTANEOUS TISSUE: Primary | ICD-10-CM

## 2022-03-07 ENCOUNTER — OFFICE VISIT (OUTPATIENT)
Dept: SURGERY | Facility: OTHER | Age: 52
End: 2022-03-07
Attending: SURGERY
Payer: COMMERCIAL

## 2022-03-07 VITALS
SYSTOLIC BLOOD PRESSURE: 126 MMHG | HEART RATE: 76 BPM | DIASTOLIC BLOOD PRESSURE: 74 MMHG | BODY MASS INDEX: 37.44 KG/M2 | TEMPERATURE: 97 F | RESPIRATION RATE: 16 BRPM | WEIGHT: 207.6 LBS

## 2022-03-07 DIAGNOSIS — D17.30 LIPOMA OF SKIN AND SUBCUTANEOUS TISSUE: Primary | ICD-10-CM

## 2022-03-07 PROCEDURE — 12041 INTMD RPR N-HF/GENIT 2.5CM/<: CPT | Performed by: SURGERY

## 2022-03-07 PROCEDURE — 11423 EXC H-F-NK-SP B9+MARG 2.1-3: CPT | Performed by: SURGERY

## 2022-03-07 PROCEDURE — 88304 TISSUE EXAM BY PATHOLOGIST: CPT

## 2022-03-07 ASSESSMENT — PAIN SCALES - GENERAL: PAINLEVEL: NO PAIN (0)

## 2022-03-07 NOTE — PROGRESS NOTES
Procedure Note     Pre/Post Operative Diagnosis:   Posterior neck mass, lipoma    Procedure:    Excision of posterior neck lipoma    Surgeon: SUNNI Cordero MD     Local Anesthesia: 1% lidocaine with0.25%Marcaine with epinephrine    Indication for the procedure:    This is a 52 year old female patient with posterior neck mass.  Ultrasound was [facility] and the mass appears to be lipoma on ultrasound.  Patient state the mass is been present for several years and is slowly getting bigger.  Now when she lays her head back on things it is uncomfortable.  She would like to have it removed.  No previous history of lipoma.  No previous history of skin cancer.  Clinically, there is a 2 cm x 2 cm subcutaneous mass on the posterior neck just to the left of midline at the hairline.  Suspect lipoma versus cyst versus lymph node.  After explaining the risks to include bleeding, infection, recurrence or need for re-excision, and scarring the patient wished to proceed.    Procedure:   The area was prepped and draped in usual sterile fashion with ChloraPrep. After adequate local anesthesia, a 2.5 cm incision was made directly over the palpable mass.  Dissection was carried down to the mass which is an obvious lipoma.  This is a lobulated lipoma that is carefully dissected from the subcutaneous tissues and removed with a rim of normal fat.  Deep tissues were reapproximated using interrupted 2-0 Vicryl sutures.  Skin was closed with running 4-0 Monocryl suture.  Skin was cleansed and dried.  Wound was dressed with skin glue.       Plan:  The patient will be called with pathology results.  Patient will followup if there any problems with the wound including redness or drainage.      SUNNI Cordero MD

## 2022-03-07 NOTE — NURSING NOTE
"Chief Complaint   Patient presents with     Procedure     lump on back of neck       Initial /74 (BP Location: Right arm, Patient Position: Sitting, Cuff Size: Adult Regular)   Pulse 76   Temp 97  F (36.1  C) (Tympanic)   Resp 16   Wt 94.2 kg (207 lb 9.6 oz)   LMP 02/07/2022 (Approximate)   Breastfeeding No   BMI 37.44 kg/m   Estimated body mass index is 37.44 kg/m  as calculated from the following:    Height as of 2/1/22: 1.586 m (5' 2.44\").    Weight as of this encounter: 94.2 kg (207 lb 9.6 oz).  Medication Reconciliation: Completed     Advanced Care Directive Reviewed    Prior to the start of the procedure and with procedural staff participation, I verbally confirmed the patient s identity using two indicators, relevant allergies, that the procedure was appropriate and matched the consent or emergent situation, and that the correct equipment/implants were available. Immediately prior to starting the procedure I conducted the Time Out with the procedural staff and re-confirmed the patient s name, procedure, and site/side. (The Joint Commission universal protocol was followed.)  Yes    Sedation (Moderate or Deep): None      Suly De Jesus LPN  "

## 2022-03-10 LAB
PATH REPORT.COMMENTS IMP SPEC: NORMAL
PATH REPORT.FINAL DX SPEC: NORMAL
PHOTO IMAGE: NORMAL

## 2022-08-08 ENCOUNTER — MYC MEDICAL ADVICE (OUTPATIENT)
Dept: FAMILY MEDICINE | Facility: OTHER | Age: 52
End: 2022-08-08

## 2022-08-08 ENCOUNTER — NURSE TRIAGE (OUTPATIENT)
Dept: FAMILY MEDICINE | Facility: OTHER | Age: 52
End: 2022-08-08

## 2022-08-08 NOTE — TELEPHONE ENCOUNTER
Reason for Disposition    [1] Symptoms of COVID-19 (e.g., cough, fever, SOB, or others) AND [2] within 14 days of EXPOSURE (close contact) with diagnosed or suspected COVID-19 patient    Protocols used: CORONAVIRUS (COVID-19) EXPOSURE-A-OH    Called patient and verified name and date of birth. Patient is concerned about exposure to covid. Her household members have had positive tests in the last 3 days. Pt with negative test today.   Educated on incubation period. Patient showing symptoms today, headache, congestion. Symptomatic treatment reviewed. COVID information letter sent on [employer]. Isolation reviewed. No further questions.  Ela Kelsey RN on 8/8/2022 at 9:13 AM

## 2022-08-08 NOTE — TELEPHONE ENCOUNTER
Patient is testing negative for covid has of this am but is having symptoms.    Both of her kids are positive.    Wants a call back. Thank you   Ariane Reed on 8/8/2022 at 8:40 AM

## 2022-08-08 NOTE — LETTER
August 8, 2022      Hyacinth Powell  [address] [place] [address]                  This letter provides a written record that you were tested for COVID-19. Your result was positive. This means you have COVID-19 (coronavirus).     Is there medicine to treat COVID-19?   Yes, there are two kinds of treatment: Antiviral (virus-killing) medicine and antibody treatment (called monoclonal antibodies). These have been proven safe and effective. They may make you feel better faster, keep you out of the hospital, and prevent death.   It s very important to take them early in your illness before you get worse.     Who should take this medicine?   These treatments are for people who are not in the hospital, but they re at risk of getting very sick from COVID. This includes people who:         Are over age 65         Are members of the BIPOC community (black, indigenous and people of color)         Are overweight (body mass index is over 25)         Are inactive (don t exercise)         Are pregnant         Smoke or vape (now or in the past)         Have a disability         Have any of the following health problems: Diabetes, high blood pressure, cancer, heart problems, liver disease, lung disease, kidney disease, sickle cell disease, cystic fibrosis (CF), dementia and other neurological (brain) diseases, HIV, thalassemia or tuberculosis         Have ever had a stroke, organ transplant or blood cell transplant         Have a mental health problem or substance abuse disorder (drugs, alcohol)         Have a weak immune system (are immuno-compromised)     If your risk is high, you may be eligible for treatment.     To schedule an appointment to discuss COVID-19 treatment, you can:    ? Call your family clinic. Or, dial [phone] ([phone]) and press [phone].    ? Go to [email or web address] (click  Schedule your appointment ).    ? Request an appointment on [employer] (select  COVID-19 Treatment ).    How can I take care of myself?   1. Get lots of rest. Drink extra fluids (unless a doctor has told you not to).   2. Take acetaminophen (Tylenol) for fever or pain. If you have liver or kidney problems, ask your family doctor if it s okay to take acetaminophen (Tylenol).     Take either:         650 mg (two 325 mg pills) every 4 to 6 hours (up to 10 pills per day), or          1,000 mg (two 500 mg pills) every 6 hours as needed (up to 6 pills per day).         Note: Don t take more than 3,000 mg of acetaminophen (Tylenol) in one day. Acetaminophen is found in many medicines (both prescribed and over-the-counter medicines). Read all labels to be sure you don t take too much.     For children         Check the acetaminophen (Tylenol) bottle for the right dose (based on their age or weight.)         Don t give children more than 1,625 mg of acetaminophen in one day.   3. If you have other health problems (like cancer, heart failure, an organ transplant or severe kidney disease): Call your specialty clinic if you don t feel better in the next 2 days.     4. Know when to call 911: Emergency warning signs include:         Trouble breathing or shortness of breath         Pain or pressure in the chest that doesn t go away         Feeling confused like you haven t felt before, or not being able to wake up         Bluish-colored lips or face     How can I protect others?   If you DO have symptoms:          Stay home and away from others (self-isolate):    ? For at least 5 days after your symptoms started, AND UNTIL    ? You ve had no fever for 24 hours--with no medicine that reduces fever, AND    ? Your other symptoms (such as a cough) are better.   ? Wear a mask or face covering for 10 full days anytime you re around other people.     If you DON T have symptoms:     Stay at home and away from others for at least 5 days after your positive test.     Wear a mask or face covering for 10 full days anytime you re around other  people.     If you were severely ill from COVID-19 or have a weak immune system, stay at home and away from others for at least 10 days.     During self-isolation:         Stay home until it s safe to be around others.         At home, stay away from other people and pets. Or, wear a well-fitting mask when you need to be around others.         Monitor your symptoms. If you have any emergency warning signs (including trouble breathing), seek emergency medical care right away.         Stay in a separate room from other household members, if possible.         Use a separate bathroom, if possible.         Improve ventilation at home, if possible.         Don t share personal household items, like cups, towels, and utensils.     If you plan to visit a clinic or hospital, please check their visitor guidelines before you arrive--healthcare sites may have different rules. If you were tested because you re going to have surgery or another procedure, contact your care team for next steps.       You should NOT go back to work until you meet the guidelines above for ending your home isolation. You don t need to be re-tested for COVID-19 before going back to work. Studies show that you won t spread the virus if it s been at least 10 days since your symptoms started (or 20 days if you have a weak immune system).   Employers, schools and daycares: This document serves as formal notice of medical guidelines before your employee or student can return to work or school. They must meet the above guidelines before going back in person.   Where can I get more information?     [facility]: [email or web address]     What to Do If You re Sick: www.cdc.gov/coronavirus/2019-ncov/if-you-are-sick/steps-when-sick.html     Ending Home Isolation:   www.cdc.gov/coronavirus/2019-ncov/your-health/quarantine-isolation.html     [facility] clinical trials (COVID-19 research studies):  [email or web address]

## 2022-08-11 ENCOUNTER — MYC MEDICAL ADVICE (OUTPATIENT)
Dept: FAMILY MEDICINE | Facility: OTHER | Age: 52
End: 2022-08-11

## 2022-08-11 NOTE — TELEPHONE ENCOUNTER
My chart message sent to schedule anti viral phone visit if interested.  Marianna Skaggs LPN ....................  8/11/2022   9:43 AM

## 2022-08-11 NOTE — TELEPHONE ENCOUNTER
Noted.  If she is within 5 days of onset of symptoms and she is interested in treatment, please help her make a telephone visit for consideration of Paxlovid.  Vernell Duff MD

## 2022-09-17 ENCOUNTER — HEALTH MAINTENANCE LETTER (OUTPATIENT)
Age: 52
End: 2022-09-17

## 2022-10-14 ENCOUNTER — ALLIED HEALTH/NURSE VISIT (OUTPATIENT)
Dept: FAMILY MEDICINE | Facility: OTHER | Age: 52
End: 2022-10-14
Attending: FAMILY MEDICINE
Payer: COMMERCIAL

## 2022-10-14 DIAGNOSIS — Z23 NEED FOR PROPHYLACTIC VACCINATION AND INOCULATION AGAINST INFLUENZA: Primary | ICD-10-CM

## 2022-10-14 PROCEDURE — 90682 RIV4 VACC RECOMBINANT DNA IM: CPT

## 2022-10-14 PROCEDURE — 90471 IMMUNIZATION ADMIN: CPT

## 2023-02-20 ENCOUNTER — HOSPITAL ENCOUNTER (OUTPATIENT)
Dept: MAMMOGRAPHY | Facility: OTHER | Age: 53
Discharge: HOME | End: 2023-02-20
Attending: FAMILY MEDICINE | Admitting: FAMILY MEDICINE
Payer: COMMERCIAL

## 2023-02-20 DIAGNOSIS — Z12.31 VISIT FOR SCREENING MAMMOGRAM: ICD-10-CM

## 2023-02-20 PROCEDURE — 77067 SCR MAMMO BI INCL CAD: CPT

## 2023-02-23 ENCOUNTER — OFFICE VISIT (OUTPATIENT)
Dept: FAMILY MEDICINE | Facility: OTHER | Age: 53
End: 2023-02-23
Attending: FAMILY MEDICINE
Payer: COMMERCIAL

## 2023-02-23 VITALS
BODY MASS INDEX: 35 KG/M2 | HEART RATE: 97 BPM | HEIGHT: 62 IN | SYSTOLIC BLOOD PRESSURE: 124 MMHG | OXYGEN SATURATION: 98 % | DIASTOLIC BLOOD PRESSURE: 84 MMHG | TEMPERATURE: 97.1 F | RESPIRATION RATE: 16 BRPM | WEIGHT: 190.2 LBS

## 2023-02-23 DIAGNOSIS — L30.9 ECZEMA, UNSPECIFIED TYPE: ICD-10-CM

## 2023-02-23 DIAGNOSIS — Z13.0 SCREENING FOR DEFICIENCY ANEMIA: ICD-10-CM

## 2023-02-23 DIAGNOSIS — Z13.29 SCREENING FOR THYROID DISORDER: ICD-10-CM

## 2023-02-23 DIAGNOSIS — Z13.1 SCREENING FOR DIABETES MELLITUS: ICD-10-CM

## 2023-02-23 DIAGNOSIS — Z00.00 HEALTH CARE MAINTENANCE: Primary | ICD-10-CM

## 2023-02-23 DIAGNOSIS — Z12.4 SCREENING FOR CERVICAL CANCER: ICD-10-CM

## 2023-02-23 DIAGNOSIS — Z13.220 SCREENING FOR LIPID DISORDERS: ICD-10-CM

## 2023-02-23 PROCEDURE — G0123 SCREEN CERV/VAG THIN LAYER: HCPCS | Performed by: FAMILY MEDICINE

## 2023-02-23 PROCEDURE — 87624 HPV HI-RISK TYP POOLED RSLT: CPT | Mod: ZL | Performed by: FAMILY MEDICINE

## 2023-02-23 PROCEDURE — 99396 PREV VISIT EST AGE 40-64: CPT | Performed by: FAMILY MEDICINE

## 2023-02-23 RX ORDER — CLOBETASOL PROPIONATE 0.5 MG/G
CREAM TOPICAL 2 TIMES DAILY PRN
Qty: 15 G | Refills: 3 | Status: SHIPPED | OUTPATIENT
Start: 2023-02-23 | End: 2024-04-23

## 2023-02-23 RX ORDER — DESONIDE 0.5 MG/G
CREAM TOPICAL 2 TIMES DAILY PRN
Qty: 15 G | Refills: 3 | Status: SHIPPED | OUTPATIENT
Start: 2023-02-23 | End: 2024-04-23

## 2023-02-23 ASSESSMENT — ENCOUNTER SYMPTOMS
HEMATURIA: 0
DYSURIA: 0
HEARTBURN: 0
CHILLS: 0
NAUSEA: 0
MYALGIAS: 1
SORE THROAT: 0
HEMATOCHEZIA: 0
DIZZINESS: 0
JOINT SWELLING: 0
FREQUENCY: 0
PARESTHESIAS: 0
EYE PAIN: 0
WEAKNESS: 0
ARTHRALGIAS: 1
CONSTIPATION: 0
ABDOMINAL PAIN: 0
DIARRHEA: 0
COUGH: 0
NERVOUS/ANXIOUS: 0
SHORTNESS OF BREATH: 0
HEADACHES: 0
PALPITATIONS: 0
FEVER: 0

## 2023-02-23 ASSESSMENT — PAIN SCALES - GENERAL: PAINLEVEL: NO PAIN (0)

## 2023-02-23 NOTE — PROGRESS NOTES
SUBJECTIVE:   CC: Hyacinth is an 53 year old who presents for preventive health visit.   Patient has been advised of split billing requirements and indicates understanding: Yes  Healthy Habits:     Getting at least 3 servings of Calcium per day:  Yes    Bi-annual eye exam:  Yes    Dental care twice a year:  Yes    Sleep apnea or symptoms of sleep apnea:  None    Diet:  Other    Frequency of exercise:  2-3 days/week    Duration of exercise:  30-45 minutes    Taking medications regularly:  Not Applicable    Medication side effects:  Not applicable    PHQ-2 Total Score: 0    Additional concerns today:  No    Mom is on hospice right now and due to CHF.  She would like to delay her cologuard because of this.              Today's PHQ-2 Score:   PHQ-2 ( 1999 Pf[employer]) 2/23/2023   Q1: Little interest or pleasure in doing things 0   Q2: Feeling down, depressed or hopeless 0   PHQ-2 Score 0   PHQ-2 Total Score (12-17 Years)- Positive if 3 or more points; Administer PHQ-A if positive -   Q1: Little interest or pleasure in doing things Not at all   Q2: Feeling down, depressed or hopeless Not at all   PHQ-2 Score 0       Have you ever done Advance Care Planning? (For example, a Health Directive, POLST, or a discussion with a medical provider or your loved ones about your wishes): No, advance care planning information given to patient to review.  Patient declined advance care planning discussion at this time.    Social History     Tobacco Use     Smoking status: Never     Smokeless tobacco: Never   Substance Use Topics     Alcohol use: Not Currently     Alcohol/week: 0.0 standard drinks     Comment: rare 6 drinks per year     If you drink alcohol do you typically have >3 drinks per day or >7 drinks per week? No    Alcohol Use 2/23/2023   Prescreen: >3 drinks/day or >7 drinks/week? No       Reviewed orders with patient.  Reviewed health maintenance and updated orders accordingly - Yes  BP Readings from Last 3 Encounters:   02/23/23  124/84   22 126/74   22 118/70    Wt Readings from Last 3 Encounters:   23 86.3 kg (190 lb 3.2 oz)   22 94.2 kg (207 lb 9.6 oz)   22 93.9 kg (207 lb)                  Patient Active Problem List   Diagnosis     Family history of malignant neoplasm of breast     Allergic rhinitis     Hypertrophy of breast     History of major depression     Obesity     Past Surgical History:   Procedure Laterality Date     OTHER SURGICAL HISTORY      ,,ANAL SURGERY,Anal fistula-Dr White       Social History     Tobacco Use     Smoking status: Never     Smokeless tobacco: Never   Substance Use Topics     Alcohol use: Not Currently     Alcohol/week: 0.0 standard drinks     Comment: rare 6 drinks per year     Family History   Problem Relation Age of Onset     Breast Cancer Mother         Cancer-breast diagnosed in her 70s     Diabetes Mother         Diabetes,Type 2     Heart Disease Father         Heart Disease,CAD/s/p CABG,  of CHF     Congenital heart disease Brother          as a      Family History Negative Brother      Thyroid Disease Sister      Family History Negative Son         Good Health,     Family History Negative Son         Good Health,     Other - See Comments Sister         Cerebral aneurysm         Current Outpatient Medications   Medication Sig Dispense Refill     clobetasol (TEMOVATE) 0.05 % external cream Apply topically 2 times daily as needed (itching, rash) 15 g 3     desonide (DESOWEN) 0.05 % external cream Apply topically 2 times daily as needed (rash on face) 15 g 3     Allergies   Allergen Reactions     Naproxen GI Disturbance     Penicillins Diarrhea       Breast Cancer Screening:  Any new diagnosis of family breast, ovarian, or bowel cancer? No    FHS-7:   Breast CA Risk Assessment (FHS-7) 2022   Did any of your first-degree relatives have breast or ovarian cancer? No Yes No Yes   Did any of your relatives have  bilateral breast cancer? No - No Yes   Did any man in your family have breast cancer? No - No -   Did any woman in your family have breast and ovarian cancer? No - No -   Did any woman in your family have breast cancer before age 50 y? No - No -   Do you have 2 or more relatives with breast and/or ovarian cancer? No - No -   Do you have 2 or more relatives with breast and/or bowel cancer? No - No -       Mammogram Screening: Recommended annual mammography  Pertinent mammograms are reviewed under the imaging tab.    History of abnormal Pap smear: NO - age 30-65 PAP every 5 years with negative HPV co-testing recommended     Reviewed and updated as needed this visit by clinical staff   Tobacco  Allergies  Meds   Med Hx  Surg Hx  Fam Hx          Reviewed and updated as needed this visit by Provider                 Past Medical History:   Diagnosis Date     Personal history of other medical treatment (CODE)     , 1 Spont miscarriage, 2 term vaginal     Postpartum depression     No Comments Provided      Past Surgical History:   Procedure Laterality Date     OTHER SURGICAL HISTORY      ,,ANAL SURGERY,Anal fistula-Dr White       Review of Systems   Constitutional: Negative for chills and fever.   HENT: Negative for congestion, ear pain, hearing loss and sore throat.    Eyes: Negative for pain and visual disturbance.   Respiratory: Negative for cough and shortness of breath.    Cardiovascular: Negative for chest pain, palpitations and peripheral edema.   Gastrointestinal: Negative for abdominal pain, constipation, diarrhea, heartburn, hematochezia and nausea.   Genitourinary: Negative for dysuria, frequency, genital sores, hematuria and urgency.   Musculoskeletal: Positive for arthralgias and myalgias. Negative for joint swelling.   Skin: Positive for rash.   Neurological: Negative for dizziness, weakness, headaches and paresthesias.   Psychiatric/Behavioral: The patient is not nervous/anxious.   "    CONSTITUTIONAL: NEGATIVE for fever, chills, change in weight  INTEGUMENTARY/SKIN: NEGATIVE for worrisome rashes, moles or lesions  EYES: NEGATIVE for vision changes or irritation  ENT: NEGATIVE for ear, mouth and throat problems  RESP: NEGATIVE for significant cough or SOB  BREAST: NEGATIVE for masses, tenderness or discharge  CV: NEGATIVE for chest pain, palpitations or peripheral edema  GI: NEGATIVE for nausea, abdominal pain, heartburn, or change in bowel habits  : NEGATIVE for unusual urinary or vaginal symptoms. No vaginal bleeding.  MUSCULOSKELETAL: NEGATIVE for significant arthralgias or myalgia  NEURO: NEGATIVE for weakness, dizziness or paresthesias  PSYCHIATRIC: NEGATIVE for changes in mood or affect      OBJECTIVE:   /84   Pulse 97   Temp 97.1  F (36.2  C) (Tympanic)   Resp 16   Ht 1.581 m (5' 2.25\")   Wt 86.3 kg (190 lb 3.2 oz)   LMP 12/04/2022   SpO2 98%   Breastfeeding No   BMI 34.51 kg/m    Physical Exam  Constitutional:       General: She is not in acute distress.     Appearance: She is well-developed.   HENT:      Head: Normocephalic.      Right Ear: Tympanic membrane and external ear normal.      Left Ear: Tympanic membrane and external ear normal.      Nose: Nose normal.      Mouth/Throat:      Pharynx: No oropharyngeal exudate.   Eyes:      General:         Right eye: No discharge.         Left eye: No discharge.      Conjunctiva/sclera: Conjunctivae normal.      Pupils: Pupils are equal, round, and reactive to light.   Neck:      Thyroid: No thyromegaly.      Trachea: No tracheal deviation.   Cardiovascular:      Rate and Rhythm: Normal rate and regular rhythm.      Pulses: Normal pulses.      Heart sounds: Normal heart sounds, S1 normal and S2 normal. No murmur heard.    No friction rub. No gallop. No S3 or S4 sounds.   Pulmonary:      Effort: Pulmonary effort is normal. No respiratory distress.      Breath sounds: Normal breath sounds. No wheezing or rales.      Comments: " Breast exam:  No masses palpable bilaterally.  No skin changes, tethering or axillary lymphadenopathy bilaterally.    Abdominal:      General: Bowel sounds are normal. There is no distension.      Palpations: Abdomen is soft. There is no mass.      Tenderness: There is no abdominal tenderness.   Genitourinary:     Comments: Pelvic Exam:  Vulva: No external lesions, normal hair distribution, no adenopathy  Vagina: Moist, pink, no abnormal discharge, well rugated, no lesions  Cervix: Pap smear obtained.  Cervix is parous, smooth, pink, no visible lesions  Uterus: Normal size, anteverted, non-tender, mobile  Ovaries: No mass, non-tender, mobile  Musculoskeletal:         General: Normal range of motion.      Cervical back: Neck supple.   Lymphadenopathy:      Cervical: No cervical adenopathy.   Skin:     General: Skin is warm and dry.      Findings: No rash.   Neurological:      Mental Status: She is alert and oriented to person, place, and time.      Motor: No abnormal muscle tone.      Deep Tendon Reflexes: Reflexes are normal and symmetric.   Psychiatric:         Mood and Affect: Mood normal.         Behavior: Behavior normal.         Thought Content: Thought content normal.         Judgment: Judgment normal.         ASSESSMENT/PLAN:       ICD-10-CM    1. Health care maintenance  Z00.00       2. Screening for cervical cancer  Z12.4 Pap Screen with HPV - recommended age 30 - 65 years      3. Screening for lipid disorders  Z13.220 Lipid Profile      4. Screening for diabetes mellitus  Z13.1 Comprehensive metabolic panel      5. Screening for thyroid disorder  Z13.29 TSH with free T4 reflex      6. Screening for deficiency anemia  Z13.0 CBC with Platelets & Differential      7. Eczema, unspecified type  L30.9 clobetasol (TEMOVATE) 0.05 % external cream     desonide (DESOWEN) 0.05 % external cream        1.  Pap Smear/HPV completed today as above.  Mammogram is up to date, last completed on 2/20/23.  Declined colon  "cancer screening for now given the busyness of life with her mom on hospice.  She will let me know if she is ready to have that ordered later this year.  Tdap and flu vaccines are up to date.  covid declined since she had the illness several months ago.  shingrix declined today.  2.  See #1.  3.  Lipid profile ordered as above.  She will return to clinic fasting another day for labs.  4.  Comprehensive Metabolic Profile as above.  5.  TSH as above.  6.  Complete Blood Count as above.  7.  Stable.  Temovate and Desonide both refilled as above.    Patient has been advised of split billing requirements and indicates understanding: Yes      COUNSELING:  Reviewed preventive health counseling, as reflected in patient instructions       Regular exercise       Healthy diet/nutrition       Vision screening       Colorectal Cancer Screening       Consider Hep C screening for all patients one time for ages 18-79 years      BMI:   Estimated body mass index is 34.51 kg/m  as calculated from the following:    Height as of this encounter: 1.581 m (5' 2.25\").    Weight as of this encounter: 86.3 kg (190 lb 3.2 oz).   Weight management plan: Discussed healthy diet and exercise guidelines      She reports that she has never smoked. She has never used smokeless tobacco.      Vernell Duff MD  [facility] AND [facility]  "

## 2023-02-23 NOTE — NURSING NOTE
"Chief Complaint   Patient presents with     Physical       Initial /84   Pulse 97   Temp 97.1  F (36.2  C) (Tympanic)   Resp 16   Ht 1.581 m (5' 2.25\")   Wt 86.3 kg (190 lb 3.2 oz)   LMP 12/04/2022   SpO2 98%   Breastfeeding No   BMI 34.51 kg/m   Estimated body mass index is 34.51 kg/m  as calculated from the following:    Height as of this encounter: 1.581 m (5' 2.25\").    Weight as of this encounter: 86.3 kg (190 lb 3.2 oz).  Medication Reconciliation: complete    FOOD SECURITY SCREENING QUESTIONS  Hunger Vital Signs:  Within the past 12 months we worried whether our food would run out before we got money to buy more. Never  Within the past 12 months the food we bought just didn't last and we didn't have money to get more. Never        Advance care directive on file? no  Advance care directive provided to patient? declined     Naty Mccall LPN  "

## 2023-03-06 ENCOUNTER — LAB (OUTPATIENT)
Dept: LAB | Facility: OTHER | Age: 53
End: 2023-03-06
Attending: FAMILY MEDICINE
Payer: COMMERCIAL

## 2023-03-06 ENCOUNTER — MYC MEDICAL ADVICE (OUTPATIENT)
Dept: FAMILY MEDICINE | Facility: OTHER | Age: 53
End: 2023-03-06

## 2023-03-06 DIAGNOSIS — Z13.1 SCREENING FOR DIABETES MELLITUS: ICD-10-CM

## 2023-03-06 DIAGNOSIS — Z13.220 SCREENING FOR LIPID DISORDERS: ICD-10-CM

## 2023-03-06 DIAGNOSIS — Z13.29 SCREENING FOR THYROID DISORDER: ICD-10-CM

## 2023-03-06 DIAGNOSIS — Z13.0 SCREENING FOR DEFICIENCY ANEMIA: ICD-10-CM

## 2023-03-06 LAB
ALBUMIN SERPL BCG-MCNC: 4 G/DL (ref 3.5–5.2)
ALP SERPL-CCNC: 63 U/L (ref 35–104)
ALT SERPL W P-5'-P-CCNC: 18 U/L (ref 10–35)
ANION GAP SERPL CALCULATED.3IONS-SCNC: 7 MMOL/L (ref 7–15)
AST SERPL W P-5'-P-CCNC: 19 U/L (ref 10–35)
BASOPHILS # BLD AUTO: 0 10E3/UL (ref 0–0.2)
BASOPHILS NFR BLD AUTO: 1 %
BILIRUB SERPL-MCNC: 0.5 MG/DL
BUN SERPL-MCNC: 19.4 MG/DL (ref 6–20)
CALCIUM SERPL-MCNC: 8.9 MG/DL (ref 8.6–10)
CHLORIDE SERPL-SCNC: 104 MMOL/L (ref 98–107)
CHOLEST SERPL-MCNC: 160 MG/DL
CREAT SERPL-MCNC: 0.65 MG/DL (ref 0.51–0.95)
DEPRECATED HCO3 PLAS-SCNC: 29 MMOL/L (ref 22–29)
EOSINOPHIL # BLD AUTO: 0.1 10E3/UL (ref 0–0.7)
EOSINOPHIL NFR BLD AUTO: 1 %
ERYTHROCYTE [DISTWIDTH] IN BLOOD BY AUTOMATED COUNT: 13.4 % (ref 10–15)
GFR SERPL CREATININE-BSD FRML MDRD: >90 ML/MIN/1.73M2
GLUCOSE SERPL-MCNC: 77 MG/DL (ref 70–99)
HCT VFR BLD AUTO: 37.8 % (ref 35–47)
HDLC SERPL-MCNC: 69 MG/DL
HGB BLD-MCNC: 12.8 G/DL (ref 11.7–15.7)
HOLD SPECIMEN: NORMAL
IMM GRANULOCYTES # BLD: 0 10E3/UL
IMM GRANULOCYTES NFR BLD: 0 %
LDLC SERPL CALC-MCNC: 77 MG/DL
LYMPHOCYTES # BLD AUTO: 1.9 10E3/UL (ref 0.8–5.3)
LYMPHOCYTES NFR BLD AUTO: 30 %
MCH RBC QN AUTO: 30.2 PG (ref 26.5–33)
MCHC RBC AUTO-ENTMCNC: 33.9 G/DL (ref 31.5–36.5)
MCV RBC AUTO: 89 FL (ref 78–100)
MONOCYTES # BLD AUTO: 0.5 10E3/UL (ref 0–1.3)
MONOCYTES NFR BLD AUTO: 8 %
NEUTROPHILS # BLD AUTO: 3.9 10E3/UL (ref 1.6–8.3)
NEUTROPHILS NFR BLD AUTO: 60 %
NONHDLC SERPL-MCNC: 91 MG/DL
NRBC # BLD AUTO: 0 10E3/UL
NRBC BLD AUTO-RTO: 0 /100
PLATELET # BLD AUTO: 221 10E3/UL (ref 150–450)
POTASSIUM SERPL-SCNC: 3.8 MMOL/L (ref 3.4–5.3)
PROT SERPL-MCNC: 6.4 G/DL (ref 6.4–8.3)
RBC # BLD AUTO: 4.24 10E6/UL (ref 3.8–5.2)
SODIUM SERPL-SCNC: 140 MMOL/L (ref 136–145)
TRIGL SERPL-MCNC: 72 MG/DL
TSH SERPL DL<=0.005 MIU/L-ACNC: 2.27 UIU/ML (ref 0.3–4.2)
WBC # BLD AUTO: 6.4 10E3/UL (ref 4–11)

## 2023-03-06 PROCEDURE — 85025 COMPLETE CBC W/AUTO DIFF WBC: CPT | Mod: ZL

## 2023-03-06 PROCEDURE — 80061 LIPID PANEL: CPT | Mod: ZL

## 2023-03-06 PROCEDURE — 80053 COMPREHEN METABOLIC PANEL: CPT | Mod: ZL

## 2023-03-06 PROCEDURE — 84443 ASSAY THYROID STIM HORMONE: CPT | Mod: ZL

## 2023-03-06 PROCEDURE — 36415 COLL VENOUS BLD VENIPUNCTURE: CPT | Mod: ZL

## 2023-03-07 LAB
BKR LAB AP GYN ADEQUACY: NORMAL
BKR LAB AP GYN INTERPRETATION: NORMAL
BKR LAB AP HPV REFLEX: NORMAL
BKR LAB AP LMP: NORMAL
BKR LAB AP PREVIOUS ABNORMAL: NORMAL
HUMAN PAPILLOMA VIRUS 16 DNA: NEGATIVE
HUMAN PAPILLOMA VIRUS 18 DNA: NEGATIVE
HUMAN PAPILLOMA VIRUS FINAL DIAGNOSIS: NORMAL
HUMAN PAPILLOMA VIRUS OTHER HR: NEGATIVE
PATH REPORT.COMMENTS IMP SPEC: NORMAL
PATH REPORT.COMMENTS IMP SPEC: NORMAL
PATH REPORT.RELEVANT HX SPEC: NORMAL

## 2023-03-07 NOTE — TELEPHONE ENCOUNTER
[facility] histology dept states it has been received but not resulted at this time.  The employee that reads the results will be back in the clinic tomorrow.  They will leave her a note to work on this.    Meggan Wilson LPN............3/7/2023 9:36 AM

## 2023-03-07 NOTE — TELEPHONE ENCOUNTER
Patient notified of response and will be contacted with results when they become available.  Meggan Wilson LPN............3/7/2023 10:01 AM

## 2023-11-05 ENCOUNTER — OFFICE VISIT (OUTPATIENT)
Dept: FAMILY MEDICINE | Facility: OTHER | Age: 53
End: 2023-11-05
Payer: COMMERCIAL

## 2023-11-05 ENCOUNTER — HOSPITAL ENCOUNTER (OUTPATIENT)
Dept: GENERAL RADIOLOGY | Facility: OTHER | Age: 53
Discharge: HOME | End: 2023-11-05
Payer: COMMERCIAL

## 2023-11-05 VITALS
DIASTOLIC BLOOD PRESSURE: 82 MMHG | RESPIRATION RATE: 16 BRPM | WEIGHT: 194 LBS | HEART RATE: 76 BPM | OXYGEN SATURATION: 99 % | TEMPERATURE: 96.9 F | SYSTOLIC BLOOD PRESSURE: 136 MMHG | BODY MASS INDEX: 35.2 KG/M2

## 2023-11-05 DIAGNOSIS — M79.672 LEFT FOOT PAIN: Primary | ICD-10-CM

## 2023-11-05 PROCEDURE — 99213 OFFICE O/P EST LOW 20 MIN: CPT

## 2023-11-05 PROCEDURE — 73630 X-RAY EXAM OF FOOT: CPT | Mod: LT

## 2023-11-05 ASSESSMENT — PAIN SCALES - GENERAL: PAINLEVEL: MILD PAIN (3)

## 2023-11-05 NOTE — PROGRESS NOTES
ASSESSMENT/PLAN:    (M79.672) Left foot pain  (primary encounter diagnosis)  Comment: Patient reviewed ports that her left foot has been painful for the past 2 weeks and a few days ago there has been bruising around the area of pain, she does not recall any injury to the area.  She would like to rule out a fracture today.  On exam of the left foot there is tenderness along the base of the fifth metatarsal, very faint ecchymosis, no swelling, no erythema, CMS intact and pedal pulse 2+.  X-ray was obtained and shows calcaneal spurs but no fractures or dislocations.  Spurs are noted in the area of the tenderness.  At this time I recommend conservative therapy.  Follow-up if symptoms are persisting.  X-ray was negative for fracture or dislocation today.  You did have some heel spurs noted on the x-ray.  I believe this is an incidental finding.  I recommend you continue conservative therapy for the bruising and discomfort and use Tylenol/ibuprofen as needed for discomfort, elevate affected extremity, ice alternating with heat, close follow-up if symptoms are persisting.    Discussed warning signs/symptoms indicative of need to f/u    Follow up if symptoms persist or worsen or concerns    I have reviewed the nursing notes.  I have reviewed the findings, diagnosis, plan and need for follow up with the patient.    I explained my diagnostic considerations and recommendations to the patient, who voiced understanding and agreement with the treatment plan. All questions were answered. We discussed potential side effects of any prescribed or recommended therapies, as well as expectations for response to treatments.    PAPA PÉREZ, KATELYN CNP  11/5/2023  12:15 PM    HPI:    Hyacinth Powell is a 53 year old female  who presents to [facility] today for concerns of foot injury.    On her left foot with bruising and swelling for the past 2 weeks.  It is painful intermittently. She does not recall injury to the foot. The bruising  showed up over the past couple of days.     No known history of osteoporosis or osteopenia.     PCP: [facility]    Allergy to Naproxen and PCN    Past Medical History:   Diagnosis Date    Personal history of other medical treatment (CODE)     , 1 Spont miscarriage, 2 term vaginal    Postpartum depression     No Comments Provided     Past Surgical History:   Procedure Laterality Date    OTHER SURGICAL HISTORY      ,ANAL SURGERY,Anal fistula-Dr White     Social History     Tobacco Use    Smoking status: Never    Smokeless tobacco: Never   Substance Use Topics    Alcohol use: Not Currently     Alcohol/week: 0.0 standard drinks of alcohol     Comment: rare 6 drinks per year     Current Outpatient Medications   Medication Sig Dispense Refill    clobetasol (TEMOVATE) 0.05 % external cream Apply topically 2 times daily as needed (itching, rash) 15 g 3    desonide (DESOWEN) 0.05 % external cream Apply topically 2 times daily as needed (rash on face) 15 g 3     Allergies   Allergen Reactions    Naproxen GI Disturbance    Penicillins Diarrhea     Past medical history, past surgical history, current medications and allergies reviewed and accurate to the best of my knowledge.      ROS:  Refer to HPI    /82 (BP Location: Right arm, Patient Position: Sitting, Cuff Size: Adult Regular)   Pulse 76   Temp 96.9  F (36.1  C) (Tympanic)   Resp 16   Wt 88 kg (194 lb)   LMP 2022 (Approximate)   SpO2 99%   BMI 35.20 kg/m      EXAM:  General Appearance: Well appearing 53 year old female, appropriate appearance for age. No acute distress   Eyes: conjunctivae normal without erythema or irritation, corneas clear, no drainage or crusting, no eyelid swelling, pupils equal   Oropharynx: moist mucous membranes,  voice clear.    Nose:  Bilateral nares: no erythema, no edema, no drainage or congestion   Neck: supple without adenopathy  Respiratory: normal chest wall and respirations.  Normal effort.  Clear to  auscultation bilaterally, no wheezing, crackles or rhonchi.  No increased work of breathing.  No cough appreciated.  Cardiac: RRR with no murmurs  Musculoskeletal:  Equal movement of bilateral upper extremities.    Left foot: Tenderness along the base of the fifth metatarsal, very faint ecchymosis, no swelling, no erythema, CMS intact and pedal pulse 2+.  Dermatological: no rashes noted of exposed skin  Neuro: Alert and oriented to person, place, and time.  Cranial nerves II-XII grossly intact with no focal or lateralizing deficits.  Muscle tone normal.  Gait normal. No tremor.   Psychological: normal affect, alert, oriented, and pleasant.     Xray:  Results for orders placed or performed in visit on 11/05/23   XR Foot Left G/E 3 Views     Status: None    Narrative    PROCEDURE:  XR FOOT LEFT G/E 3 VIEWS    HISTORY: Left foot pain    COMPARISON:  None.    TECHNIQUE:  3 views of the left foot were obtained.    FINDINGS:  No fracture or dislocation is identified. The joint spaces  are preserved.  There are bony spurs at the insertion of the Achilles  tendon and plantar fascia into the calcaneus.      Impression    IMPRESSION: Calcaneal spurs    NATASHA SELF MD         SYSTEM ID:  [id number]

## 2023-11-05 NOTE — PATIENT INSTRUCTIONS
X-ray was negative for fracture or dislocation today.  You did have some heel spurs noted on the x-ray.  I believe this is an incidental finding.  I recommend you continue conservative therapy for the bruising and discomfort and use Tylenol/ibuprofen as needed for discomfort, elevate affected extremity, ice alternating with heat, close follow-up if symptoms are persisting.

## 2024-03-01 ENCOUNTER — MYC MEDICAL ADVICE (OUTPATIENT)
Dept: FAMILY MEDICINE | Facility: OTHER | Age: 54
End: 2024-03-01
Payer: COMMERCIAL

## 2024-03-19 ENCOUNTER — OFFICE VISIT (OUTPATIENT)
Dept: FAMILY MEDICINE | Facility: OTHER | Age: 54
End: 2024-03-19
Attending: PHYSICIAN ASSISTANT
Payer: COMMERCIAL

## 2024-03-19 VITALS
HEART RATE: 82 BPM | TEMPERATURE: 98.6 F | RESPIRATION RATE: 16 BRPM | DIASTOLIC BLOOD PRESSURE: 78 MMHG | HEIGHT: 62 IN | WEIGHT: 204.2 LBS | OXYGEN SATURATION: 96 % | BODY MASS INDEX: 37.58 KG/M2 | SYSTOLIC BLOOD PRESSURE: 126 MMHG

## 2024-03-19 DIAGNOSIS — J02.9 SORE THROAT: ICD-10-CM

## 2024-03-19 DIAGNOSIS — H92.09 OTALGIA, UNSPECIFIED LATERALITY: ICD-10-CM

## 2024-03-19 DIAGNOSIS — J06.9 VIRAL URI: Primary | ICD-10-CM

## 2024-03-19 LAB
FLUAV RNA SPEC QL NAA+PROBE: NEGATIVE
FLUBV RNA RESP QL NAA+PROBE: NEGATIVE
GROUP A STREP BY PCR: NOT DETECTED
RSV RNA SPEC NAA+PROBE: NEGATIVE
SARS-COV-2 RNA RESP QL NAA+PROBE: NEGATIVE

## 2024-03-19 PROCEDURE — 87637 SARSCOV2&INF A&B&RSV AMP PRB: CPT | Mod: ZL | Performed by: PHYSICIAN ASSISTANT

## 2024-03-19 PROCEDURE — 99213 OFFICE O/P EST LOW 20 MIN: CPT | Performed by: PHYSICIAN ASSISTANT

## 2024-03-19 PROCEDURE — 87651 STREP A DNA AMP PROBE: CPT | Mod: ZL | Performed by: PHYSICIAN ASSISTANT

## 2024-03-19 ASSESSMENT — PAIN SCALES - GENERAL: PAINLEVEL: MILD PAIN (3)

## 2024-03-19 NOTE — PATIENT INSTRUCTIONS
May use symptomatic care with tylenol or ibuprofen. Sudafed or mucinex work well for congestion. May use cough syrup or cough drops.  Using a humidifier works well to break up the congestion. You can also sleep propped up on a couple pillows to decrease symptoms at night.    Please take tylenol as needed up to 4 times daily.  Treat symptomatically with warm salt water gargles.  Lozenges, Tylenol, Advil or Aleve as needed. Frequent swallows of cool liquid.  Oatmeal coats the throat and some patients find it soothes the pain. Encouraged warm teas or fluids with honey.     If you have sinus congestion -  Use a Neti Pot/sinus flush (Duglas Med Sinus Rinse) 3 times daily to irrigate sinuses/mucosal tissue.     Monitor for any fevers or chills. Return in 7-10 days if not feeling better. Please call clinic with any questions or concerns. Return to clinic with change/worsening of symptoms.   Encouraged fluids and rest.    Call 9-1-1 or go to the emergency room if you:  Have trouble breathing   Are drooling because you cannot swallow your saliva   Have swelling of the neck or tongue   Cannot move your neck or have trouble opening your mouth

## 2024-03-19 NOTE — PROGRESS NOTES
"  Assessment & Plan   Problem List Items Addressed This Visit    None  Visit Diagnoses       Viral URI    -  Primary    Sore throat        Otalgia, unspecified laterality        Relevant Orders    Group A Streptococcus PCR Throat Swab (Completed)    Symptomatic Influenza A/B, RSV, & SARS-CoV2 PCR (COVID-19) Nose (Completed)           Sore throat and otalgia: Completed strep, influenza A/B, recipe, and COVID-19 to rule out bacterial infection and viral concerns.  Tests are negative.  Symptoms are viral.    Encouraged increase of electrolytes and rest.  Can use over-the-counter cough and cold remedies as needed for symptomatic relief.  Gave patient education. Return to clinic with change/worsening of symptoms.     Ordering of each unique test       BMI  Estimated body mass index is 37.35 kg/m  as calculated from the following:    Height as of this encounter: 1.575 m (5' 2\").    Weight as of this encounter: 92.6 kg (204 lb 3.2 oz).   Weight management plan: Discussed healthy diet and exercise guidelines    See Patient Instructions    Return if symptoms worsen or fail to improve.      Joann Claros is a 54 year old, presenting for the following health issues:  URI        3/19/2024     2:13 PM   Additional Questions   Roomed by Ra Malcolm LPN     History of Present Illness       Reason for visit:  Illness  Symptom onset:  1-3 days ago  Symptoms include:  Cough ear troat pain  Symptom intensity:  Moderate  Symptom progression:  Worsening  Had these symptoms before:  No  What makes it worse:  Na  What makes it better:  Tylenol tea    She eats 4 or more servings of fruits and vegetables daily.She consumes 0 sweetened beverage(s) daily.She exercises with enough effort to increase her heart rate 30 to 60 minutes per day.  She exercises with enough effort to increase her heart rate 5 days per week.   She is taking medications regularly.     Patient currently has her mother living with her who is in hospice.  The mother " "getting sick approximately 1 week ago.  Had some increased fluid in her lungs.  Patient personally has had increased coughing over the last 2 to 3 days with phlegm.  Yesterday she folic she had a frog in her throat.  Voice is up-and-down.  Throat felt on fire this morning.  Having some left-sided ear pain.  Lost her voice.  No fevers, GI symptoms.  Mild sinus pressure on the cheeks.  No wheezing or rattling in the lungs.  Using Mucinex as needed for cold relief.  Keeping food and fluids down.  No dehydration concerns.    Review of Systems  Constitutional, HEENT, cardiovascular, pulmonary, gi and gu systems are negative, except as otherwise noted.      Objective    /78   Pulse 82   Temp 98.6  F (37  C)   Resp 16   Ht 1.575 m (5' 2\")   Wt 92.6 kg (204 lb 3.2 oz)   SpO2 96%   Breastfeeding No   BMI 37.35 kg/m    Body mass index is 37.35 kg/m .  Physical Exam  Vitals and nursing note reviewed.   Constitutional:       Appearance: Normal appearance.   HENT:      Head: Normocephalic and atraumatic.      Right Ear: Tympanic membrane, ear canal and external ear normal.      Left Ear: Tympanic membrane, ear canal and external ear normal.      Mouth/Throat:      Mouth: Mucous membranes are moist.      Pharynx: Oropharynx is clear. Posterior oropharyngeal erythema present. No oropharyngeal exudate.      Comments: Mild sinus pain to palpation over the maxillary sinuses.  Cardiovascular:      Rate and Rhythm: Normal rate and regular rhythm.      Heart sounds: Normal heart sounds.   Pulmonary:      Effort: Pulmonary effort is normal.      Breath sounds: Normal breath sounds. No wheezing or rales.   Musculoskeletal:         General: Normal range of motion.      Cervical back: Normal range of motion and neck supple.   Lymphadenopathy:      Cervical: Cervical adenopathy present.   Skin:     General: Skin is warm and dry.   Neurological:      General: No focal deficit present.      Mental Status: She is alert. "   Psychiatric:         Mood and Affect: Mood normal.         Behavior: Behavior normal.            Results for orders placed or performed in visit on 03/19/24   Symptomatic Influenza A/B, RSV, & SARS-CoV2 PCR (COVID-19) Nose     Status: Normal    Specimen: Nose; Swab   Result Value Ref Range    Influenza A PCR Negative Negative    Influenza B PCR Negative Negative    RSV PCR Negative Negative    SARS CoV2 PCR Negative Negative    Narrative    Testing was performed using the Xpert Xpress CoV2/Flu/RSV Assay on the [employer] Instrument. This test should be ordered for the detection of SARS-CoV-2, influenza, and RSV viruses in individuals who meet clinical and/or epidemiological criteria. Test performance is unknown in asymptomatic patients. This test is for in vitro diagnostic use under the FDA EUA for laboratories certified under CLIA to perform high or moderate complexity testing. This test has not been FDA cleared or approved. A negative result does not rule out the presence of PCR inhibitors in the specimen or target RNA in concentration below the limit of detection for the assay. If only one viral target is positive but coinfection with multiple targets is suspected, the sample should be re-tested with another FDA cleared, approved, or authorized test, if coinfection would change clinical management. This test was validated by the [facility]. These laboratories are certified under the Clinical Laboratory Improvement Amendments of 1988 (CLIA-88) as qualified to perform high complexity laboratory testing.   Group A Streptococcus PCR Throat Swab     Status: Normal    Specimen: Throat; Swab   Result Value Ref Range    Group A strep by PCR Not Detected Not Detected    Narrative    The Xpert Xpress Strep A test, performed on the [employer], is a rapid, qualitative in vitro diagnostic test for the detection of Streptococcus pyogenes (Group A ß-hemolytic Streptococcus, Strep  A) in throat swab specimens from patients with signs and symptoms of pharyngitis. The Xpert Xpress Strep A test can be used as an aid in the diagnosis of Group A Streptococcal pharyngitis. The assay is not intended to monitor treatment for Group A Streptococcus infections. The Xpert Xpress Strep A test utilizes an automated real-time polymerase chain reaction (PCR) to detect Streptococcus pyogenes DNA.           Signed Electronically by: Ana Lilia Escalante PA-C

## 2024-03-19 NOTE — NURSING NOTE
"Chief Complaint   Patient presents with    URI       Initial /78   Pulse 82   Temp 98.6  F (37  C)   Resp 16   Ht 1.575 m (5' 2\")   Wt 92.6 kg (204 lb 3.2 oz)   SpO2 96%   Breastfeeding No   BMI 37.35 kg/m   Estimated body mass index is 37.35 kg/m  as calculated from the following:    Height as of this encounter: 1.575 m (5' 2\").    Weight as of this encounter: 92.6 kg (204 lb 3.2 oz).  Medication Review: complete    The next two questions are to help us understand your food security.  If you are feeling you need any assistance in this area, we have resources available to support you today.          3/19/2024   SDOH- Food Insecurity   Within the past 12 months, did you worry that your food would run out before you got money to buy more? N   Within the past 12 months, did the food you bought just not last and you didn t have money to get more? N         Health Care Directive:  Patient does not have a Health Care Directive or Living Will: Discussed advance care planning with patient; however, patient declined at this time.    Gretel Malcolm LPN      "

## 2024-03-20 ENCOUNTER — MYC MEDICAL ADVICE (OUTPATIENT)
Dept: FAMILY MEDICINE | Facility: OTHER | Age: 54
End: 2024-03-20
Payer: COMMERCIAL

## 2024-03-20 NOTE — TELEPHONE ENCOUNTER
Reached out to PRASHANTH Aguilar about Mucinex DM question.  Awaiting a response.     Lily Contreras RN on 3/20/2024 at 11:24 AM

## 2024-04-02 ENCOUNTER — HOSPITAL ENCOUNTER (OUTPATIENT)
Dept: MAMMOGRAPHY | Facility: OTHER | Age: 54
Discharge: HOME | End: 2024-04-02
Attending: FAMILY MEDICINE | Admitting: FAMILY MEDICINE
Payer: COMMERCIAL

## 2024-04-02 DIAGNOSIS — Z12.31 VISIT FOR SCREENING MAMMOGRAM: ICD-10-CM

## 2024-04-02 PROCEDURE — 77063 BREAST TOMOSYNTHESIS BI: CPT

## 2024-04-23 ENCOUNTER — OFFICE VISIT (OUTPATIENT)
Dept: FAMILY MEDICINE | Facility: OTHER | Age: 54
End: 2024-04-23
Attending: FAMILY MEDICINE
Payer: COMMERCIAL

## 2024-04-23 VITALS
RESPIRATION RATE: 16 BRPM | DIASTOLIC BLOOD PRESSURE: 80 MMHG | OXYGEN SATURATION: 97 % | HEIGHT: 62 IN | HEART RATE: 82 BPM | BODY MASS INDEX: 37.84 KG/M2 | TEMPERATURE: 97.4 F | WEIGHT: 205.6 LBS | SYSTOLIC BLOOD PRESSURE: 124 MMHG

## 2024-04-23 DIAGNOSIS — Z13.29 SCREENING FOR THYROID DISORDER: ICD-10-CM

## 2024-04-23 DIAGNOSIS — Z00.00 ROUTINE GENERAL MEDICAL EXAMINATION AT A HEALTH CARE FACILITY: Primary | ICD-10-CM

## 2024-04-23 DIAGNOSIS — L60.3 BRITTLE NAILS: ICD-10-CM

## 2024-04-23 DIAGNOSIS — Z13.0 SCREENING FOR DEFICIENCY ANEMIA: ICD-10-CM

## 2024-04-23 DIAGNOSIS — Z13.1 SCREENING FOR DIABETES MELLITUS: ICD-10-CM

## 2024-04-23 DIAGNOSIS — L30.9 ECZEMA, UNSPECIFIED TYPE: ICD-10-CM

## 2024-04-23 DIAGNOSIS — Z13.220 SCREENING FOR LIPID DISORDERS: ICD-10-CM

## 2024-04-23 LAB
ALBUMIN SERPL BCG-MCNC: 4.7 G/DL (ref 3.5–5.2)
ALP SERPL-CCNC: 74 U/L (ref 40–150)
ALT SERPL W P-5'-P-CCNC: 19 U/L (ref 0–50)
ANION GAP SERPL CALCULATED.3IONS-SCNC: 12 MMOL/L (ref 7–15)
AST SERPL W P-5'-P-CCNC: 21 U/L (ref 0–45)
BASOPHILS # BLD AUTO: 0 10E3/UL (ref 0–0.2)
BASOPHILS NFR BLD AUTO: 0 %
BILIRUB SERPL-MCNC: 0.5 MG/DL
BUN SERPL-MCNC: 17.4 MG/DL (ref 6–20)
CALCIUM SERPL-MCNC: 9.4 MG/DL (ref 8.6–10)
CHLORIDE SERPL-SCNC: 103 MMOL/L (ref 98–107)
CHOLEST SERPL-MCNC: 184 MG/DL
CREAT SERPL-MCNC: 0.68 MG/DL (ref 0.51–0.95)
DEPRECATED HCO3 PLAS-SCNC: 25 MMOL/L (ref 22–29)
EGFRCR SERPLBLD CKD-EPI 2021: >90 ML/MIN/1.73M2
EOSINOPHIL # BLD AUTO: 0.1 10E3/UL (ref 0–0.7)
EOSINOPHIL NFR BLD AUTO: 1 %
ERYTHROCYTE [DISTWIDTH] IN BLOOD BY AUTOMATED COUNT: 13.1 % (ref 10–15)
FASTING STATUS PATIENT QL REPORTED: YES
FERRITIN SERPL-MCNC: 114 NG/ML (ref 11–328)
GLUCOSE SERPL-MCNC: 93 MG/DL (ref 70–99)
HCT VFR BLD AUTO: 42.1 % (ref 35–47)
HDLC SERPL-MCNC: 64 MG/DL
HGB BLD-MCNC: 13.8 G/DL (ref 11.7–15.7)
IMM GRANULOCYTES # BLD: 0 10E3/UL
IMM GRANULOCYTES NFR BLD: 0 %
LDLC SERPL CALC-MCNC: 104 MG/DL
LYMPHOCYTES # BLD AUTO: 1.8 10E3/UL (ref 0.8–5.3)
LYMPHOCYTES NFR BLD AUTO: 26 %
MCH RBC QN AUTO: 28.9 PG (ref 26.5–33)
MCHC RBC AUTO-ENTMCNC: 32.8 G/DL (ref 31.5–36.5)
MCV RBC AUTO: 88 FL (ref 78–100)
MONOCYTES # BLD AUTO: 0.5 10E3/UL (ref 0–1.3)
MONOCYTES NFR BLD AUTO: 7 %
NEUTROPHILS # BLD AUTO: 4.6 10E3/UL (ref 1.6–8.3)
NEUTROPHILS NFR BLD AUTO: 66 %
NONHDLC SERPL-MCNC: 120 MG/DL
NRBC # BLD AUTO: 0 10E3/UL
NRBC BLD AUTO-RTO: 0 /100
PLATELET # BLD AUTO: 261 10E3/UL (ref 150–450)
POTASSIUM SERPL-SCNC: 3.9 MMOL/L (ref 3.4–5.3)
PROT SERPL-MCNC: 7.3 G/DL (ref 6.4–8.3)
RBC # BLD AUTO: 4.77 10E6/UL (ref 3.8–5.2)
SODIUM SERPL-SCNC: 140 MMOL/L (ref 135–145)
TRIGL SERPL-MCNC: 82 MG/DL
TSH SERPL DL<=0.005 MIU/L-ACNC: 1.79 UIU/ML (ref 0.3–4.2)
WBC # BLD AUTO: 7 10E3/UL (ref 4–11)

## 2024-04-23 PROCEDURE — 80053 COMPREHEN METABOLIC PANEL: CPT | Mod: ZL | Performed by: FAMILY MEDICINE

## 2024-04-23 PROCEDURE — 99396 PREV VISIT EST AGE 40-64: CPT | Performed by: FAMILY MEDICINE

## 2024-04-23 PROCEDURE — 80061 LIPID PANEL: CPT | Mod: ZL | Performed by: FAMILY MEDICINE

## 2024-04-23 PROCEDURE — 85025 COMPLETE CBC W/AUTO DIFF WBC: CPT | Mod: ZL | Performed by: FAMILY MEDICINE

## 2024-04-23 PROCEDURE — 84443 ASSAY THYROID STIM HORMONE: CPT | Mod: ZL | Performed by: FAMILY MEDICINE

## 2024-04-23 PROCEDURE — 36415 COLL VENOUS BLD VENIPUNCTURE: CPT | Mod: ZL | Performed by: FAMILY MEDICINE

## 2024-04-23 PROCEDURE — 82728 ASSAY OF FERRITIN: CPT | Mod: ZL | Performed by: FAMILY MEDICINE

## 2024-04-23 RX ORDER — DESONIDE 0.5 MG/G
CREAM TOPICAL 2 TIMES DAILY PRN
Qty: 15 G | Refills: 3 | Status: SHIPPED | OUTPATIENT
Start: 2024-04-23

## 2024-04-23 RX ORDER — CLOBETASOL PROPIONATE 0.5 MG/G
CREAM TOPICAL 2 TIMES DAILY PRN
Qty: 15 G | Refills: 3 | Status: SHIPPED | OUTPATIENT
Start: 2024-04-23

## 2024-04-23 SDOH — HEALTH STABILITY: PHYSICAL HEALTH: ON AVERAGE, HOW MANY DAYS PER WEEK DO YOU ENGAGE IN MODERATE TO STRENUOUS EXERCISE (LIKE A BRISK WALK)?: 3 DAYS

## 2024-04-23 ASSESSMENT — SOCIAL DETERMINANTS OF HEALTH (SDOH): HOW OFTEN DO YOU GET TOGETHER WITH FRIENDS OR RELATIVES?: PATIENT DECLINED

## 2024-04-23 ASSESSMENT — PAIN SCALES - GENERAL: PAINLEVEL: NO PAIN (0)

## 2024-04-23 NOTE — NURSING NOTE
Chief Complaint   Patient presents with    Physical         Medication Reconciliation: complete    Symone Bee, LPN

## 2024-04-23 NOTE — PROGRESS NOTES
Preventive Care Visit  [facility] AND [facility]  Vernell Duff MD, Family Medicine  Apr 23, 2024          Subjective   Hyacinth is a 54 year old, presenting for the following:  Physical        4/23/2024     9:27 AM   Additional Questions   Roomed by Symone Bee        Health Care Directive  Patient does not have a Health Care Directive or Living Will: Discussed advance care planning with patient; however, patient declined at this time.    HPI    She has had increased stress with caring for her mom, who has end stage CHF.  Her mom is now on oxygen for the past couple of months.    Hyacinth has noticed that her nails are getting brittle.  She has had longitudinal ridges that she has noticed, which are sometimes splitting.  She had been on the [employer] weight loss program for a period of time which relies on prepackaged foods.  She had stopped that program due to its expense and lack of variety with food options.  She gained back the weight she had lost with this.  She is now participating with [employer] again, which she has done in the past.        4/23/2024   General Health   How would you rate your overall physical health? Good   Feel stress (tense, anxious, or unable to sleep) Not at all         4/23/2024   Nutrition   Three or more servings of calcium each day? Yes   Diet: Regular (no restrictions)   How many servings of fruit and vegetables per day? 4 or more   How many sweetened beverages each day? 0-1         4/23/2024   Exercise   Days per week of moderate/strenous exercise 3 days         4/23/2024   Social Factors   Frequency of gathering with friends or relatives Patient declined   Worry food won't last until get money to buy more No   Food not last or not have enough money for food? No   Do you have housing?  Yes   Are you worried about losing your housing? No   Lack of transportation? No   Unable to get utilities (heat,electricity)? No         4/23/2024   Fall Risk   Fallen 2 or more times in  the past year? No   Trouble with walking or balance? No          4/23/2024   Dental   Dentist two times every year? Yes         4/23/2024   TB Screening   Were you born outside of the [place]? No         Today's PHQ-2 Score:       4/23/2024     9:23 AM   PHQ-2 ( 1999 [employer])   Q1: Little interest or pleasure in doing things 0   Q2: Feeling down, depressed or hopeless 0   PHQ-2 Score 0   Q1: Little interest or pleasure in doing things Not at all   Q2: Feeling down, depressed or hopeless Not at all   PHQ-2 Score 0           4/23/2024   Substance Use   Alcohol more than 3/day or more than 7/wk No   Do you use any other substances recreationally? No     Social History     Tobacco Use    Smoking status: Never    Smokeless tobacco: Never   Vaping Use    Vaping status: Never Used   Substance Use Topics    Alcohol use: Not Currently     Alcohol/week: 0.0 standard drinks of alcohol     Comment: rare 6 drinks per year    Drug use: No             4/23/2024   Breast Cancer Screening   Family history of breast, colon, or ovarian cancer? Yes         4/23/2024   LAST FHS-7 RESULTS   1st degree relative breast or ovarian cancer Yes   Any relative bilateral breast cancer Yes   Any male have breast cancer No   Any ONE woman have BOTH breast AND ovarian cancer No   Any woman with breast cancer before 50yrs No   2 or more relatives with breast AND/OR ovarian cancer No   2 or more relatives with breast AND/OR bowel cancer No        Mammogram Screening - Mammogram every 1-2 years updated in Health Maintenance based on mutual decision making        4/23/2024   STI Screening   New sexual partner(s) since last STI/HIV test? No     History of abnormal Pap smear: NO - age 30-65 PAP every 5 years with negative HPV co-testing recommended        Latest Ref Rng & Units 2/23/2023     1:27 PM   PAP / HPV   PAP  Negative for Intraepithelial Lesion or Malignancy (NILM)    HPV 16 DNA Negative Negative    HPV 18 DNA Negative Negative    Other HR HPV  Negative Negative      ASCVD Risk   The 10-year ASCVD risk score (Edel HERNÁNDEZ, et al., 2019) is: 0.9%    Values used to calculate the score:      Age: 54 years      Sex: Female      Is Non- : No      Diabetic: No      Tobacco smoker: No      Systolic Blood Pressure: 126 mmHg      Is BP treated: No      HDL Cholesterol: 69 mg/dL      Total Cholesterol: 160 mg/dL           Reviewed and updated as needed this visit by Provider                    Past Medical History:   Diagnosis Date    Personal history of other medical treatment (CODE)     , 1 Spont miscarriage, 2 term vaginal    Postpartum depression     No Comments Provided     Past Surgical History:   Procedure Laterality Date    OTHER SURGICAL HISTORY      ,ANAL SURGERY,Anal fistula-Dr White     BP Readings from Last 3 Encounters:   24 124/80   24 126/78   23 136/82    Wt Readings from Last 3 Encounters:   24 93.3 kg (205 lb 9.6 oz)   24 92.6 kg (204 lb 3.2 oz)   23 88 kg (194 lb)                  Patient Active Problem List   Diagnosis    Family history of malignant neoplasm of breast    Allergic rhinitis    Hypertrophy of breast    History of major depression    Obesity     Past Surgical History:   Procedure Laterality Date    OTHER SURGICAL HISTORY      ,ANAL SURGERY,Anal fistula-Dr White       Social History     Tobacco Use    Smoking status: Never    Smokeless tobacco: Never   Substance Use Topics    Alcohol use: Not Currently     Alcohol/week: 0.0 standard drinks of alcohol     Comment: rare 6 drinks per year     Family History   Problem Relation Age of Onset    Breast Cancer Mother         Cancer-breast diagnosed in her 70s    Diabetes Mother         Diabetes,Type 2    Heart Disease Father         Heart Disease,CAD/s/p CABG,  of CHF    Congenital heart disease Brother          as a     Family History Negative Brother     Thyroid Disease Sister      "Family History Negative Son         Good Health,2004    Family History Negative Son         Good Health,2010    Other - See Comments Sister         Cerebral aneurysm         Current Outpatient Medications   Medication Sig Dispense Refill    clobetasol propionate (TEMOVATE) 0.05 % external cream Apply topically 2 times daily as needed (itching, rash) 15 g 3    desonide (DESOWEN) 0.05 % external cream Apply topically 2 times daily as needed (rash on face) 15 g 3     Allergies   Allergen Reactions    Naproxen GI Disturbance    Penicillins Diarrhea         Review of Systems  Constitutional, HEENT, cardiovascular, pulmonary, GI, , musculoskeletal, neuro, skin, endocrine and psych systems are negative, except as otherwise noted.     Objective    Exam  Resp 16   Ht 1.575 m (5' 2\")   Wt 93.3 kg (205 lb 9.6 oz)   Breastfeeding No   BMI 37.60 kg/m     Estimated body mass index is 37.6 kg/m  as calculated from the following:    Height as of this encounter: 1.575 m (5' 2\").    Weight as of this encounter: 93.3 kg (205 lb 9.6 oz).    Physical Exam  Constitutional:       General: She is not in acute distress.     Appearance: She is well-developed.   HENT:      Head: Normocephalic.      Right Ear: Tympanic membrane and external ear normal.      Left Ear: Tympanic membrane and external ear normal.      Nose: Nose normal.      Mouth/Throat:      Mouth: Mucous membranes are moist.      Pharynx: Oropharynx is clear. No oropharyngeal exudate or posterior oropharyngeal erythema.   Eyes:      General:         Right eye: No discharge.         Left eye: No discharge.      Conjunctiva/sclera: Conjunctivae normal.      Pupils: Pupils are equal, round, and reactive to light.   Neck:      Thyroid: No thyromegaly.      Trachea: No tracheal deviation.   Cardiovascular:      Rate and Rhythm: Normal rate and regular rhythm.      Pulses: Normal pulses.      Heart sounds: Normal heart sounds, S1 normal and S2 normal. No murmur heard.     No " friction rub. No gallop. No S3 or S4 sounds.   Pulmonary:      Effort: Pulmonary effort is normal. No respiratory distress.      Breath sounds: Normal breath sounds. No wheezing or rales.      Comments: Breast exam:  No masses palpable bilaterally.  No skin changes, tethering or axillary lymphadenopathy bilaterally.    Abdominal:      General: Bowel sounds are normal. There is no distension.      Palpations: Abdomen is soft. There is no mass.      Tenderness: There is no abdominal tenderness.   Genitourinary:     Comments: Pelvic/Rectal exams deferred per patient.  Musculoskeletal:         General: Normal range of motion.      Cervical back: Neck supple.   Lymphadenopathy:      Cervical: No cervical adenopathy.   Skin:     General: Skin is warm and dry.      Findings: No rash.      Comments: Longitudinal ridges of some of her fingernails.   Neurological:      Mental Status: She is alert and oriented to person, place, and time.      Motor: No abnormal muscle tone.      Deep Tendon Reflexes: Reflexes are normal and symmetric.   Psychiatric:         Mood and Affect: Mood normal.         Thought Content: Thought content normal.         Judgment: Judgment normal.         ICD-10-CM    1. Routine general medical examination at a health care facility  Z00.00       2. Screening for lipid disorders  Z13.220 Lipid Profile     Lipid Profile      3. Screening for diabetes mellitus  Z13.1 Comprehensive metabolic panel     Comprehensive metabolic panel      4. Screening for thyroid disorder  Z13.29 TSH with free T4 reflex     TSH with free T4 reflex      5. Screening for deficiency anemia  Z13.0 CBC with Platelets & Differential     Ferritin     CBC with Platelets & Differential     Ferritin      6. Eczema, unspecified type  L30.9 clobetasol propionate (TEMOVATE) 0.05 % external cream     desonide (DESOWEN) 0.05 % external cream      7. Brittle nails  L60.3            Mammogram is up to date (4/2/24).  Pap Smear/HPV are up to  date, 2/23/23 and both normal at that time.  She declines colon cancer screening for now.  Tdap and flu are up to date.  Declines covid and Shingrix.  Labs as above.  Labs as above.  Labs as above.  Labs as above.  Stable.  Refills as above.  Likely related to weight loss and inadequate protein intake.  Recommend increasing her protein intake and consider skin/hair/nail vitamin and/or collagen as well.  TSH and ferritin also will be completed as noted above.    Return in about 53 weeks (around 4/29/2025) for Annual Wellness Visit.           Vernell Duff MD

## 2024-04-23 NOTE — PATIENT INSTRUCTIONS
Preventive Care Advice   This is general advice given by our system to help you stay healthy. However, your care team may have specific advice just for you. Please talk to your care team about your preventive care needs.  Nutrition  Eat 5 or more servings of fruits and vegetables each day.  Try wheat bread, brown rice and whole grain pasta (instead of white bread, rice, and pasta).  Get enough calcium and vitamin D. Check the label on foods and aim for 100% of the RDA (recommended daily allowance).  Lifestyle  Exercise at least 150 minutes each week   (30 minutes a day, 5 days a week).  Do muscle strengthening activities 2 days a week. These help control your weight and prevent disease.  No smoking.  Wear sunscreen to prevent skin cancer.  Have a dental exam and cleaning every 6 months.  Yearly exams  See your health care team every year to talk about:  Any changes in your health.  Any medicines your care team has prescribed.  Preventive care, family planning, and ways to prevent chronic diseases.  Shots (vaccines)   HPV shots (up to age 26), if you've never had them before.  Hepatitis B shots (up to age 59), if you've never had them before.  COVID-19 shot: Get this shot when it's due.  Flu shot: Get a flu shot every year.  Tetanus shot: Get a tetanus shot every 10 years.  Pneumococcal, hepatitis A, and RSV shots: Ask your care team if you need these based on your risk.  Shingles shot (for age 50 and up).  General health tests  Diabetes screening:  Starting at age 35, Get screened for diabetes at least every 3 years.  If you are younger than age 35, ask your care team if you should be screened for diabetes.  Cholesterol test: At age 39, start having a cholesterol test every 5 years, or more often if advised.  Bone density scan (DEXA): At age 50, ask your care team if you should have this scan for osteoporosis (brittle bones).  Hepatitis C: Get tested at least once in your life.  STIs (sexually transmitted  infections)  Before age 24: Ask your care team if you should be screened for STIs.  After age 24: Get screened for STIs if you're at risk. You are at risk for STIs (including HIV) if:  You are sexually active with more than one person.  You don't use condoms every time.  You or a partner was diagnosed with a sexually transmitted infection.  If you are at risk for HIV, ask about PrEP medicine to prevent HIV.  Get tested for HIV at least once in your life, whether you are at risk for HIV or not.  Cancer screening tests  Cervical cancer screening: If you have a cervix, begin getting regular cervical cancer screening tests at age 21. Most people who have regular screenings with normal results can stop after age 65. Talk about this with your provider.  Breast cancer scan (mammogram): If you've ever had breasts, begin having regular mammograms starting at age 40. This is a scan to check for breast cancer.  Colon cancer screening: It is important to start screening for colon cancer at age 45.  Have a colonoscopy test every 10 years (or more often if you're at risk) Or, ask your provider about stool tests like a FIT test every year or Cologuard test every 3 years.  To learn more about your testing options, visit: https://www.[employer]/024725.pdf.  For help making a decision, visit: https://bit.ly/bk91126.  Prostate cancer screening test: If you have a prostate and are age 55 to 69, ask your provider if you would benefit from a yearly prostate cancer screening test.  Lung cancer screening: If you are a current or former smoker age 50 to 80, ask your care team if ongoing lung cancer screenings are right for you.  For informational purposes only. Not to replace the advice of your health care provider. Copyright   2023 [employer]. All rights reserved. Clinically reviewed by the [facility]. [employer] [id number] - REV 01/24.

## 2025-03-24 ENCOUNTER — PATIENT OUTREACH (OUTPATIENT)
Dept: CARE COORDINATION | Facility: CLINIC | Age: 55
End: 2025-03-24
Payer: COMMERCIAL

## 2025-03-27 ENCOUNTER — PATIENT OUTREACH (OUTPATIENT)
Dept: CARE COORDINATION | Facility: CLINIC | Age: 55
End: 2025-03-27
Payer: COMMERCIAL

## 2025-04-10 ENCOUNTER — PATIENT OUTREACH (OUTPATIENT)
Dept: CARE COORDINATION | Facility: CLINIC | Age: 55
End: 2025-04-10
Payer: COMMERCIAL

## 2025-06-07 ENCOUNTER — HEALTH MAINTENANCE LETTER (OUTPATIENT)
Age: 55
End: 2025-06-07

## 2025-06-25 ENCOUNTER — DOCUMENTATION ONLY (OUTPATIENT)
Dept: OTHER | Facility: CLINIC | Age: 55
End: 2025-06-25
Payer: COMMERCIAL